# Patient Record
Sex: MALE | Race: BLACK OR AFRICAN AMERICAN | Employment: FULL TIME | URBAN - METROPOLITAN AREA
[De-identification: names, ages, dates, MRNs, and addresses within clinical notes are randomized per-mention and may not be internally consistent; named-entity substitution may affect disease eponyms.]

---

## 2019-06-10 ENCOUNTER — OFFICE VISIT (OUTPATIENT)
Dept: INTERNAL MEDICINE CLINIC | Age: 39
End: 2019-06-10

## 2019-06-10 VITALS
HEART RATE: 68 BPM | DIASTOLIC BLOOD PRESSURE: 70 MMHG | SYSTOLIC BLOOD PRESSURE: 140 MMHG | WEIGHT: 248 LBS | HEIGHT: 72 IN | BODY MASS INDEX: 33.59 KG/M2 | OXYGEN SATURATION: 98 % | TEMPERATURE: 99.1 F | RESPIRATION RATE: 19 BRPM

## 2019-06-10 DIAGNOSIS — S93.422A SPRAIN, ANKLE JOINT, MEDIAL, LEFT, INITIAL ENCOUNTER: ICD-10-CM

## 2019-06-10 DIAGNOSIS — E78.00 HYPERCHOLESTEROLEMIA: ICD-10-CM

## 2019-06-10 DIAGNOSIS — E66.9 OBESITY (BMI 30-39.9): ICD-10-CM

## 2019-06-10 DIAGNOSIS — I10 ESSENTIAL HYPERTENSION: Primary | ICD-10-CM

## 2019-06-10 LAB
CHOLEST SERPL-MCNC: 267 MG/DL
GLUCOSE POC: 120 MG/DL
HBA1C MFR BLD HPLC: 5.5 %
HDLC SERPL-MCNC: 49 MG/DL
LDL CHOLESTEROL POC: 195 MG/DL
NON-HDL GOAL (POC): 218
TCHOL/HDL RATIO (POC): 5.5
TRIGL SERPL-MCNC: 115 MG/DL

## 2019-06-10 RX ORDER — ASPIRIN 81 MG/1
TABLET ORAL DAILY
COMMUNITY
End: 2020-09-08

## 2019-06-10 RX ORDER — GLUCOSAM/CHONDRO/HERB 149/HYAL 750-100 MG
1 TABLET ORAL DAILY
COMMUNITY

## 2019-06-10 RX ORDER — AMLODIPINE BESYLATE 5 MG/1
5 TABLET ORAL DAILY
Qty: 30 TAB | Refills: 2 | Status: SHIPPED | OUTPATIENT
Start: 2019-06-10 | End: 2019-08-16 | Stop reason: SDUPTHER

## 2019-06-10 RX ORDER — LISINOPRIL 10 MG/1
TABLET ORAL DAILY
COMMUNITY
End: 2019-08-16 | Stop reason: SDUPTHER

## 2019-06-10 NOTE — PATIENT INSTRUCTIONS
High blood pressure: Take all medicine as prescribed High Cholesterol - Follow diet for low cholesterol - Follow \"Plate\" diet: 
- half plate should be veggies (leafy greens, broccoli, peppers, cauliflower, cabbage, string beans, asparagus, squash. ..), with a little fruit 
- one fourth plate should be protein (fish, chicken, meat, tofu. ..) - one fourth plate limit for 
 - starch (beans without added sugar are great here) OR 
 - starchy veggies (lima beans, corn) OR 
 - rice / potato (sweet potato without added sugar is great here) - bread or biscuit - Eat Your Veggies First! 
- Drink one full glass of water Before you start your meal. 
- No Seconds! Hyperlipidemia: After Your Visit Your Care Instructions Hyperlipidemia is too much fat in your blood. The body has several kinds of fat, including cholesterol and triglycerides. Your body needs fat for many things, such as making new cells. But too much fat in your blood increases your chances of having a heart attack or stroke. You may be able to lower your cholesterol and triglycerides with a heart-healthy diet, exercise, and if needed, medicine. Your doctor may want you to try lifestyle changes first to see whether they lower the fat in your blood. You may need to take medicine if lifestyle changes do not lower the fat in your blood enough. Follow-up care is a key part of your treatment and safety. Be sure to make and go to all appointments, and call your doctor if you are having problems. Its also a good idea to know your test results and keep a list of the medicines you take. How can you care for yourself at home? Take your medicines · Take your medicines exactly as prescribed. Call your doctor if you think you are having a problem with your medicine. · If you take medicine to lower your cholesterol, go to follow-up visits. You will need to have blood tests.  
· Do not take large doses of niacin, which is a B vitamin, while taking medicine called statins. It may increase the chance of muscle pain and liver problems. · Talk to your doctor about avoiding grapefruit juice if you are taking statins. Grapefruit juice can raise the level of this medicine in your blood. This could increase side effects. Eat more fruits, vegetables, and fiber · Fruits and vegetables have lots of nutrients that help protect against heart disease, and they have littleif anyfat. Try to eat at least five servings a day. Dark green, deep orange, or yellow fruits and vegetables are healthy choices. · Keep carrots, celery, and other veggies handy for snacks. Buy fruit that is in season and store it where you can see it so that you will be tempted to eat it. Cook dishes that have a lot of veggies in them, such as stir-fries and soups. · Foods high in fiber may reduce your cholesterol and provide important vitamins and minerals. High-fiber foods include whole-grain cereals and breads, oatmeal, beans, brown rice, citrus fruits, and apples. · Buy whole-grain breads and cereals instead of white bread and pastries. Limit saturated fat · Read food labels and try to avoid saturated fat and trans fat. They increase your risk of heart disease. · Use olive or canola oil when you cook. Try cholesterol-lowering spreads, such as Benecol or Take Control. · Bake, broil, grill, or steam foods instead of frying them. · Limit the amount of high-fat meats you eat, including hot dogs and sausages. Cut out all visible fat when you prepare meat. · Eat fish, skinless poultry, and soy products such as tofu instead of high-fat meats. Soybeans may be especially good for your heart. Eat at least two servings of fish a week. Certain fish, such as salmon, contain omega-3 fatty acids, which may help reduce your risk of heart attack. · Choose low-fat or fat-free milk and dairy products. Get exercise, limit alcohol, and quit smoking · Get more exercise. Work with your doctor to set up an exercise program. Even if you can do only a small amount, exercise will help you get stronger, have more energy, and manage your weight and your stress. Walking is an easy way to get exercise. Gradually increase the amount you walk every day. Aim for at least 30 minutes on most days of the week. You also may want to swim, bike, or do other activities. · Limit alcohol to no more than 2 drinks a day for men and 1 drink a day for women. · Do not smoke. If you need help quitting, talk to your doctor about stop-smoking programs and medicines. These can increase your chances of quitting for good. When should you call for help? Call 911 anytime you think you may need emergency care. For example, call if: 
· You have symptoms of a heart attack. These may include: ¨ Chest pain or pressure, or a strange feeling in the chest. 
¨ Sweating. ¨ Shortness of breath. ¨ Nausea or vomiting. ¨ Pain, pressure, or a strange feeling in the back, neck, jaw, or upper belly or in one or both shoulders or arms. ¨ Lightheadedness or sudden weakness. ¨ A fast or irregular heartbeat. After you call 911, the  may tell you to chew 1 adult-strength or 2 to 4 low-dose aspirin. Wait for an ambulance. Do not try to drive yourself. · You have signs of a stroke. These may include: 
¨ Sudden numbness, paralysis, or weakness in your face, arm, or leg, especially on only one side of your body. ¨ New problems with walking or balance. ¨ Sudden vision changes. ¨ Drooling or slurred speech. ¨ New problems speaking or understanding simple statements, or feeling confused. ¨ A sudden, severe headache that is different from past headaches. · You passed out (lost consciousness). Call your doctor now or seek immediate medical care if: 
· You have muscle pain or weakness. Watch closely for changes in your health, and be sure to contact your doctor if: 
· You are very tired. · You have an upset stomach, gas, constipation, or belly pain or cramps. Where can you learn more? Go to [employer] Enter C406 in the search box to learn more about \"Hyperlipidemia: After Your Visit. \"  
© 7874-7913 Healthwise, Incorporated. Care instructions adapted under license by [facility] (which disclaims liability or warranty for this information). This care instruction is for use with your licensed healthcare professional. If you have questions about a medical condition or this instruction, always ask your healthcare professional. [place] [address] any warranty or liability for your use of this information. Content Version: [id number]; Last Revised: October 13, 2011 High Cholesterol: Care Instructions Your Care Instructions Cholesterol is a type of fat in your blood. It is needed for many body functions, such as making new cells. Cholesterol is made by your body. It also comes from food you eat. High cholesterol means that you have too much of the fat in your blood. This raises your risk of a heart attack and stroke. LDL and HDL are part of your total cholesterol. LDL is the \"bad\" cholesterol. High LDL can raise your risk for heart disease, heart attack, and stroke. HDL is the \"good\" cholesterol. It helps clear bad cholesterol from the body. High HDL is linked with a lower risk of heart disease, heart attack, and stroke. Your cholesterol levels help your doctor find out your risk for having a heart attack or stroke. You and your doctor can talk about whether you need to lower your risk and what treatment is best for you. A heart-healthy lifestyle along with medicines can help lower your cholesterol and your risk. The way you choose to lower your risk will depend on how high your risk is for heart attack and stroke. It will also depend on how you feel about taking medicines. Follow-up care is a key part of your treatment and safety. Be sure to make and go to all appointments, and call your doctor if you are having problems. It's also a good idea to know your test results and keep a list of the medicines you take. How can you care for yourself at home? · Eat a variety of foods every day. Good choices include fruits, vegetables, whole grains (like oatmeal), dried beans and peas, nuts and seeds, soy products (like tofu), and fat-free or low-fat dairy products. · Replace butter, margarine, and hydrogenated or partially hydrogenated oils with olive and canola oils. (Canola oil margarine without trans fat is fine.) · Replace red meat with fish, poultry, and soy protein (like tofu). · Limit processed and packaged foods like chips, crackers, and cookies. · Bake, broil, or steam foods. Don't carias them. · Be physically active. Get at least 30 minutes of exercise on most days of the week. Walking is a good choice. You also may want to do other activities, such as running, swimming, cycling, or playing tennis or team sports. · Stay at a healthy weight or lose weight by making the changes in eating and physical activity listed above. Losing just a small amount of weight, even 5 to 10 pounds, can reduce your risk for having a heart attack or stroke. · Do not smoke. When should you call for help? Watch closely for changes in your health, and be sure to contact your doctor if: 
  · You need help making lifestyle changes.  
  · You have questions about your medicine. Where can you learn more? Go to http://katia-valerie.info/. Enter J097 in the search box to learn more about \"High Cholesterol: Care Instructions. \" Current as of: July 22, 2018 Content Version: 11.9 © 5020-0239 [employer].  Care instructions adapted under license by [employer] (which disclaims liability or warranty for this information). If you have questions about a medical condition or this instruction, always ask your healthcare professional. [address] any warranty or liability for your use of this information. High Cholesterol: Care Instructions Your Care Instructions Cholesterol is a type of fat in your blood. It is needed for many body functions, such as making new cells. Cholesterol is made by your body. It also comes from food you eat. High cholesterol means that you have too much of the fat in your blood. This raises your risk of a heart attack and stroke. LDL and HDL are part of your total cholesterol. LDL is the \"bad\" cholesterol. High LDL can raise your risk for heart disease, heart attack, and stroke. HDL is the \"good\" cholesterol. It helps clear bad cholesterol from the body. High HDL is linked with a lower risk of heart disease, heart attack, and stroke. Your cholesterol levels help your doctor find out your risk for having a heart attack or stroke. You and your doctor can talk about whether you need to lower your risk and what treatment is best for you. A heart-healthy lifestyle along with medicines can help lower your cholesterol and your risk. The way you choose to lower your risk will depend on how high your risk is for heart attack and stroke. It will also depend on how you feel about taking medicines. Follow-up care is a key part of your treatment and safety. Be sure to make and go to all appointments, and call your doctor if you are having problems. It's also a good idea to know your test results and keep a list of the medicines you take. How can you care for yourself at home? · Eat a variety of foods every day. Good choices include fruits, vegetables, whole grains (like oatmeal), dried beans and peas, nuts and seeds, soy products (like tofu), and fat-free or low-fat dairy products.  
· Replace butter, margarine, and hydrogenated or partially hydrogenated oils with olive and canola oils. (Canola oil margarine without trans fat is fine.) · Replace red meat with fish, poultry, and soy protein (like tofu). · Limit processed and packaged foods like chips, crackers, and cookies. · Bake, broil, or steam foods. Don't carias them. · Be physically active. Get at least 30 minutes of exercise on most days of the week. Walking is a good choice. You also may want to do other activities, such as running, swimming, cycling, or playing tennis or team sports. · Stay at a healthy weight or lose weight by making the changes in eating and physical activity listed above. Losing just a small amount of weight, even 5 to 10 pounds, can reduce your risk for having a heart attack or stroke. · Do not smoke. When should you call for help? Watch closely for changes in your health, and be sure to contact your doctor if: 
  · You need help making lifestyle changes.  
  · You have questions about your medicine. Where can you learn more? Go to http://katia-valerie.info/. Enter F121 in the search box to learn more about \"High Cholesterol: Care Instructions. \" Current as of: July 22, 2018 Content Version: 11.9 © 6279-1240 [email or web address]. Care instructions adapted under license by [employer] (which disclaims liability or warranty for this information). If you have questions about a medical condition or this instruction, always ask your healthcare professional. [place] [address] any warranty or liability for your use of this information. High Cholesterol: Care Instructions Your Care Instructions Cholesterol is a type of fat in your blood. It is needed for many body functions, such as making new cells. Cholesterol is made by your body. It also comes from food you eat. High cholesterol means that you have too much of the fat in your blood. This raises your risk of a heart attack and stroke. LDL and HDL are part of your total cholesterol. LDL is the \"bad\" cholesterol. High LDL can raise your risk for heart disease, heart attack, and stroke. HDL is the \"good\" cholesterol. It helps clear bad cholesterol from the body. High HDL is linked with a lower risk of heart disease, heart attack, and stroke. Your cholesterol levels help your doctor find out your risk for having a heart attack or stroke. You and your doctor can talk about whether you need to lower your risk and what treatment is best for you. A heart-healthy lifestyle along with medicines can help lower your cholesterol and your risk. The way you choose to lower your risk will depend on how high your risk is for heart attack and stroke. It will also depend on how you feel about taking medicines. Follow-up care is a key part of your treatment and safety. Be sure to make and go to all appointments, and call your doctor if you are having problems. It's also a good idea to know your test results and keep a list of the medicines you take. How can you care for yourself at home? · Eat a variety of foods every day. Good choices include fruits, vegetables, whole grains (like oatmeal), dried beans and peas, nuts and seeds, soy products (like tofu), and fat-free or low-fat dairy products. · Replace butter, margarine, and hydrogenated or partially hydrogenated oils with olive and canola oils. (Canola oil margarine without trans fat is fine.) · Replace red meat with fish, poultry, and soy protein (like tofu). · Limit processed and packaged foods like chips, crackers, and cookies. · Bake, broil, or steam foods. Don't carias them. · Be physically active. Get at least 30 minutes of exercise on most days of the week. Walking is a good choice. You also may want to do other activities, such as running, swimming, cycling, or playing tennis or team sports.  
· Stay at a healthy weight or lose weight by making the changes in eating and physical activity listed above. Losing just a small amount of weight, even 5 to 10 pounds, can reduce your risk for having a heart attack or stroke. · Do not smoke. When should you call for help? Watch closely for changes in your health, and be sure to contact your doctor if: 
  · You need help making lifestyle changes.  
  · You have questions about your medicine. Where can you learn more? Go to http://katia-valerie.info/. Enter I642 in the search box to learn more about \"High Cholesterol: Care Instructions. \" Current as of: July 22, 2018 Content Version: 11.9 © 2171-2424 [employer], [email or web address]. Care instructions adapted under license by [employer] (which disclaims liability or warranty for this information). If you have questions about a medical condition or this instruction, always ask your healthcare professional. [address] any warranty or liability for your use of this information.

## 2019-06-10 NOTE — LETTER
1/28/2020 2:11 PM 
 
Mr. Malik [address] [address] Dear Hamida Edwards., 
 
When you were seen in the office on 6/10/2019, you had a complete physical and lab work: 
 
CBC, CMP, LIPID PANEL, GLUCOSE AND A1C. Please call if you have any questions [phone]. Sincerely, Elise Wong MD

## 2019-06-10 NOTE — LETTER
1/28/2020 2:11 PM 
 
Mr. Malik [address] [id number] Dear [facility] 
 
When you were seen in the office on 6/10/2019, you had a wellness exam & visit which included a complete physical and lab work: 
 
CBC, CMP, LIPID PANEL, GLUCOSE AND A1C. Your were given instructions for diet, weight loss goal of BMI less than 29 and advised to perform at least 150 mins/wk of moderate exercise, like brisk walking. Please call if you have any questions [phone]. Sincerely, Barb Landa MD

## 2019-06-10 NOTE — PROGRESS NOTES
1. Have you been to the ER, urgent care clinic since your last visit? Hospitalized since your last visit? yes feb 2019    2. Have you seen or consulted any other health care providers outside of the [address] [place] [address] [facility] since your last visit? Include any pap smears or colon screening.  no        3 most recent PHQ Screens 6/10/2019   PHQ Not Done Patient Decline     Chief Complaint   Patient presents with   [address]

## 2019-06-28 LAB
ALBUMIN SERPL-MCNC: 4.1 G/DL (ref 3.5–5.5)
ALBUMIN/GLOB SERPL: 1.4 {RATIO} (ref 1.2–2.2)
ALP SERPL-CCNC: 46 IU/L (ref 39–117)
ALT SERPL-CCNC: 16 IU/L (ref 0–44)
AST SERPL-CCNC: 20 IU/L (ref 0–40)
BILIRUB SERPL-MCNC: 0.4 MG/DL (ref 0–1.2)
BUN SERPL-MCNC: 14 MG/DL (ref 6–24)
BUN/CREAT SERPL: 14 (ref 9–20)
CALCIUM SERPL-MCNC: 9.2 MG/DL (ref 8.7–10.2)
CHLORIDE SERPL-SCNC: 102 MMOL/L (ref 96–106)
CO2 SERPL-SCNC: 27 MMOL/L (ref 20–29)
CREAT SERPL-MCNC: 1.03 MG/DL (ref 0.76–1.27)
ERYTHROCYTE [DISTWIDTH] IN BLOOD BY AUTOMATED COUNT: 12.4 % (ref 12.3–15.4)
GLOBULIN SER CALC-MCNC: 2.9 G/DL (ref 1.5–4.5)
GLUCOSE SERPL-MCNC: 103 MG/DL (ref 65–99)
HCT VFR BLD AUTO: 46 % (ref 37.5–51)
HGB BLD-MCNC: 14.7 G/DL (ref 13–17.7)
MCH RBC QN AUTO: 27.6 PG (ref 26.6–33)
MCHC RBC AUTO-ENTMCNC: 32 G/DL (ref 31.5–35.7)
MCV RBC AUTO: 86 FL (ref 79–97)
PLATELET # BLD AUTO: 288 X10E3/UL (ref 150–450)
POTASSIUM SERPL-SCNC: 4.8 MMOL/L (ref 3.5–5.2)
PROT SERPL-MCNC: 7 G/DL (ref 6–8.5)
RBC # BLD AUTO: 5.33 X10E6/UL (ref 4.14–5.8)
SODIUM SERPL-SCNC: 140 MMOL/L (ref 134–144)
WBC # BLD AUTO: 5.1 X10E3/UL (ref 3.4–10.8)

## 2019-07-08 ENCOUNTER — OFFICE VISIT (OUTPATIENT)
Dept: INTERNAL MEDICINE CLINIC | Age: 39
End: 2019-07-08

## 2019-07-08 VITALS
WEIGHT: 247.8 LBS | BODY MASS INDEX: 33.56 KG/M2 | HEIGHT: 72 IN | DIASTOLIC BLOOD PRESSURE: 74 MMHG | RESPIRATION RATE: 16 BRPM | OXYGEN SATURATION: 99 % | SYSTOLIC BLOOD PRESSURE: 116 MMHG | TEMPERATURE: 98.6 F | HEART RATE: 72 BPM

## 2019-07-08 DIAGNOSIS — E78.00 HYPERCHOLESTEROLEMIA: ICD-10-CM

## 2019-07-08 DIAGNOSIS — Z79.82 LONG TERM CURRENT USE OF ASPIRIN: ICD-10-CM

## 2019-07-08 DIAGNOSIS — R94.31 ABNORMAL EKG: ICD-10-CM

## 2019-07-08 DIAGNOSIS — E66.9 OBESITY (BMI 30-39.9): ICD-10-CM

## 2019-07-08 DIAGNOSIS — I10 ESSENTIAL HYPERTENSION: Primary | ICD-10-CM

## 2019-07-08 LAB
CHOLEST SERPL-MCNC: 213 MG/DL
HDLC SERPL-MCNC: 52 MG/DL
LDL CHOLESTEROL POC: 127 MG/DL
NON-HDL CHOLESTEROL, 011976: 161
TCHOL/HDL RATIO (POC): 4.1
TRIGL SERPL-MCNC: 170 MG/DL

## 2019-07-08 NOTE — PROGRESS NOTES
1. Have you been to the ER, urgent care clinic since your last visit? Hospitalized since your last visit?no    2. Have you seen or consulted any other health care providers outside of the [address] [place] [address] since your last visit? Include any pap smears or colon screening.  No    3 most recent PHQ Screens 6/10/2019   PHQ Not Done Patient Decline     Chief Complaint   Patient presents with    Hypertension    Cholesterol Problem    Weight Management

## 2019-07-08 NOTE — PATIENT INSTRUCTIONS
Update on Diet:    Prescription for Obesity    · Avoid processed foods. · If you eat out, get the nutrition information from fast food or chain restaurants. · Choose the meals that are less than 400-500 calories. · Exercise:  · Walk non stop for 20 - 45 mins 5 days/wk    · Prepare your meals & snacks in advance if you want to stick to eating healthy for the long term. · Take time to eat your meal Slowly over 30 mins   · Take time & savor each bite  · Put fork down with each bite  · Chew slowly  · Avoid other activities except talking to your meal companions. · Drink one full glass of water Before you start your meal.    · Salad? · Get dressing on the side  · Dip fork into dressing before each forkful of salad, unless the oil in the dressing is extra virgin olive oil (EVOO, you can pour the dressing onto the salad). · Eat Your Veggies First!     Follow \"Plate\" diet:  -half plate should be veggies  - leafy greens  - broccoli, peppers, cauliflower, cabbage, string beans, asparagus, squash. ..  - a little bit of fruit  - one fourth plate should be protein (fish, chicken, meat, tofu, Greek yogurt. ..)  - one fourth plate limit for starch  - beans without added sugar are great here) OR  - sweet potato without added sugar is great here OR    - starchy veggies (lima beans, corn) OR    - rice / potato OR    - bread or biscuit    - No Seconds!   - if you can't avoid seconds, follow the above for second plate    --------------------------------------------------------------------------------    Reminder of Low Cholesterol Diet (Mediterranean Diet is also Very Good)   - use Extr Virgin Olive Oil in your dressings and instead of butter    Eat more fruits, vegetables, and fiber   Fruits and vegetables have lots of nutrients that help protect against heart disease, and they have little--if any--fat. · Try to eat at least five servings a day.    · Dark green, deep orange, or yellow fruits and vegetables are healthy choices.  Keep carrots, celery, and other veggies handy for snacks. · Buy fruit that is in season and store it where you can see it so that you will be tempted to eat it. · Cook dishes that have a lot of veggies in them, such as stir-fries and soups.  Foods high in fiber may reduce your cholesterol and provide important vitamins and minerals. · High-fiber foods include whole-grain cereals and breads, oatmeal, beans, brown rice, citrus fruits, and apples.  Buy whole-grain breads and cereals instead of white bread and pastries. Limit saturated fat   Read food labels and try to avoid saturated fat and trans fat. They increase your risk of heart disease.  Use olive or canola oil when you cook. Try cholesterol-lowering spreads, such as Benecol or Take Control.  Bake, broil, grill, or steam foods instead of frying them.  Limit the amount of high-fat meats you eat, including hot dogs and sausages. Cut out all visible fat when you prepare meat.  Eat fish, skinless poultry, and soy products such as tofu instead of high-fat meats. Soybeans may be especially good for your heart. · Eat at least two servings of fish a week. · Certain fish, such as salmon, contain omega-3 fatty acids, which may help reduce your risk of heart attack.  Choose low-fat or fat-free milk and dairy products. Get exercise, limit alcohol, and quit smoking   Get more exercise. Work with your doctor to set up an exercise program.   · Even if you can do only a small amount, exercise will help you get stronger, have more energy, and manage your weight and your stress. · Walking is an easy way to get exercise. Gradually increase the amount you walk every day. Aim for at least 30 minutes on most days of the week. You also may want to swim, bike, or do other activities.  Limit alcohol to no more than 2 drinks a day for men and 1 drink a day for women.  Do not smoke.  If you need help quitting, talk to your doctor about stop-smoking programs and medicines. These  can increase your chances of quitting for good.

## 2019-07-09 PROBLEM — I10 ESSENTIAL HYPERTENSION: Status: ACTIVE | Noted: 2019-07-09

## 2019-07-09 PROBLEM — E78.00 HYPERCHOLESTEROLEMIA: Status: ACTIVE | Noted: 2019-07-09

## 2019-07-09 PROBLEM — S93.422A: Status: ACTIVE | Noted: 2019-07-09

## 2019-07-09 PROBLEM — E66.9 OBESITY (BMI 30-39.9): Status: ACTIVE | Noted: 2019-07-09

## 2019-08-16 ENCOUNTER — OFFICE VISIT (OUTPATIENT)
Dept: INTERNAL MEDICINE CLINIC | Age: 39
End: 2019-08-16

## 2019-08-16 VITALS
HEIGHT: 72 IN | RESPIRATION RATE: 16 BRPM | SYSTOLIC BLOOD PRESSURE: 116 MMHG | BODY MASS INDEX: 34.4 KG/M2 | DIASTOLIC BLOOD PRESSURE: 76 MMHG | OXYGEN SATURATION: 98 % | HEART RATE: 70 BPM | WEIGHT: 254 LBS | TEMPERATURE: 98.4 F

## 2019-08-16 DIAGNOSIS — E66.9 OBESITY (BMI 30-39.9): ICD-10-CM

## 2019-08-16 DIAGNOSIS — Z79.82 ASPIRIN LONG-TERM USE: ICD-10-CM

## 2019-08-16 DIAGNOSIS — I10 ESSENTIAL HYPERTENSION: Primary | ICD-10-CM

## 2019-08-16 RX ORDER — AMLODIPINE BESYLATE 5 MG/1
5 TABLET ORAL DAILY
Qty: 90 TAB | Refills: 3 | Status: SHIPPED | OUTPATIENT
Start: 2019-08-16 | End: 2020-09-08

## 2019-08-16 RX ORDER — LISINOPRIL 10 MG/1
10 TABLET ORAL DAILY
Qty: 90 TAB | Refills: 3 | Status: SHIPPED | OUTPATIENT
Start: 2019-08-16 | End: 2020-09-08 | Stop reason: SDUPTHER

## 2019-08-16 NOTE — PROGRESS NOTES
1. Have you been to the ER, urgent care clinic since your last visit? Hospitalized since your last visit?no    2. Have you seen or consulted any other health care providers outside of the [address] [place] [address] since your last visit? Include any pap smears or colon screening.  No    3 most recent PHQ Screens 6/10/2019   PHQ Not Done Patient Decline     Chief Complaint   Patient presents with    Hypertension

## 2019-08-16 NOTE — PROGRESS NOTES
CC:    Chief Complaint   Patient presents with    Hypertension     HPI:    1. Essential hypertension   - no chest pain, SOB, BANSAL, dizziness, edema  - taking meds as directed   2. Obesity (BMI 30-39.9)   - new job activities; more on machine, less active  - getting  in 8 days  - gym after work: treadmill x 10 - 15 mins then workout - lift wts. , situps, pullups. - no bread; 2 vegs every night from [place]  - lunch wrap w/ veggies, meat, lo salt chips   3. Aspirin long-term use   - read info provided; agreed not [age], doesn't think needs asa  - discussed benefits & risks  - no hx cva, mi        Current Outpatient Medications:     lisinopril (PRINIVIL, ZESTRIL) 10 mg tablet, Take  by mouth daily. , Disp: , Rfl:     omega 3-DHA-EPA-fish oil (FISH OIL) 1,000 mg (120 mg-180 mg) capsule, Take 1 Cap by mouth daily. , Disp: , Rfl:     aspirin delayed-release 81 mg tablet, Take  by mouth daily. , Disp: , Rfl:     amLODIPine (NORVASC) 5 mg tablet, Take 1 Tab by mouth daily. Indications: high blood pressure, Disp: 30 Tab, Rfl: 2    ASSESSMENT  TREATMENT  PLAN:  1. Essential hypertension  Controlled. Contin meds. - lisinopril (PRINIVIL, ZESTRIL) 10 mg tablet; Take 1 Tab by mouth daily. Indications: high blood pressure  Dispense: 90 Tab; Refill: 3  - amLODIPine (NORVASC) 5 mg tablet; Take 1 Tab by mouth daily. Indications: high blood pressure  Dispense: 90 Tab; Refill: 3    2. Obesity (BMI 30-39. 9)  Uncontrolled. - agrees to increase treadmill to 30 mins, more if can    3. Aspirin long-term use  SDM (Shared Decision Making): Pt understands could have stroke or heart attack.   - we agree to d/c per current literature since he is not [age]       PHYSICAL EXAM:  Vitals:    08/16/19 1543   BP: 116/76   Pulse: 70   Resp: 16   Temp: 98.4 °F (36.9 °C)   TempSrc: Oral   SpO2: 98%   Weight: 254 lb (115.2 kg)   Height: 6' (1.829 m)     Weight Metrics 8/16/2019 7/8/2019 6/10/2019   Weight 254 lb 247 lb 12.8 oz 248 lb   BMI 34.45 kg/m2 33.61 kg/m2 33.63 kg/m2      CONSTITUTIONAL:   WD, obese, appropriately groomed black male in NAD. HISTORY - Additional:  ROS from first OV reviewed & updated as necessary. [place] reviewed & updated as necessary. Active Ambulatory Problems     Diagnosis Date Noted    Essential hypertension 07/09/2019    Hypercholesterolemia 07/09/2019    Sprain, ankle joint, medial, left, initial encounter 07/09/2019    Obesity (BMI 30-39.9) 07/09/2019     Resolved Ambulatory Problems     Diagnosis Date Noted    No Resolved Ambulatory Problems     No Additional Past Medical History     No family history on file.    Past Surgical History:   Procedure Laterality Date    HX ORTHOPAEDIC      Age 13/16: Cut 5 tendons on glass R forearm-> nl now    [address]  2005

## 2019-09-02 NOTE — PROGRESS NOTES
CC:  Hypertension; Cholesterol Problem; and Weight Management    HPI:    1. Essential hypertension    2. Hypercholesterolemia    3. Obesity (BMI 30-39.9)    4. Long term current use of aspirin    - no chest pain, SOB, dizziness, diaphoresis  - taking meds w/o problems  - eating better per diet info given; less cholesterol     Allergies   Allergen Reactions    Benadryl [Diphenhydramine Hcl] Anxiety     Severe panic attack       Current Outpatient Medications:     omega 3-DHA-EPA-fish oil (FISH OIL) 1,000 mg (120 mg-180 mg) capsule, Take 1 Cap by mouth daily. , Disp: , Rfl:     aspirin delayed-release 81 mg tablet, Take  by mouth daily. , Disp: , Rfl:     lisinopril (PRINIVIL, ZESTRIL) 10 mg tablet, Take 1 Tab by mouth daily. Indications: high blood pressure, Disp: 90 Tab, Rfl: 3    amLODIPine (NORVASC) 5 mg tablet, Take 1 Tab by mouth daily. Indications: high blood pressure, Disp: 90 Tab, Rfl: 3      ASSESSMENT  TREATMENT  PLAN:  1. Essential hypertension  2. Hypercholesterolemia  3. Obesity (BMI 30-39.9)  4. Abnormal EKG  5. Long term current use of aspirin  BP much improved. Wt down 1 lb  - AMB POC LIPID PROFILE  - AMB POC EKG ROUTINE W/ 12 LEADS, INTER & REP  - diets & plan for ASA discussion/rationale at next visit reviewed     Patient Instructions   Update on Diet:    Prescription for Obesity    · Avoid processed foods. · If you eat out, get the nutrition information from fast food or chain restaurants. · Choose the meals that are less than 400-500 calories. · Exercise:  · Walk non stop for 20 - 45 mins 5 days/wk    · Prepare your meals & snacks in advance if you want to stick to eating healthy for the long term. · Take time to eat your meal Slowly over 30 mins   · Take time & savor each bite  · Put fork down with each bite  · Chew slowly  · Avoid other activities except talking to your meal companions. · Drink one full glass of water Before you start your meal.    · Salad?   · Get dressing on the side  · Dip fork into dressing before each forkful of salad, unless the oil in the dressing is extra virgin olive oil (EVOO, you can pour the dressing onto the salad). · Eat Your Veggies First!     Follow \"Plate\" diet:  -half plate should be veggies  - leafy greens  - broccoli, peppers, cauliflower, cabbage, string beans, asparagus, squash. ..  - a little bit of fruit  - one fourth plate should be protein (fish, chicken, meat, tofu, Greek yogurt. ..)  - one fourth plate limit for starch  - beans without added sugar are great here) OR  - sweet potato without added sugar is great here OR    - starchy veggies (lima beans, corn) OR    - rice / potato OR    - bread or biscuit    - No Seconds!   - if you can't avoid seconds, follow the above for second plate    --------------------------------------------------------------------------------    Reminder of Low Cholesterol Diet (Mediterranean Diet is also Very Good)   - use Extr Virgin Olive Oil in your dressings and instead of butter    Eat more fruits, vegetables, and fiber   Fruits and vegetables have lots of nutrients that help protect against heart disease, and they have little--if any--fat. · Try to eat at least five servings a day. · Dark green, deep orange, or yellow fruits and vegetables are healthy choices.  Keep carrots, celery, and other veggies handy for snacks. · Buy fruit that is in season and store it where you can see it so that you will be tempted to eat it. · Cook dishes that have a lot of veggies in them, such as stir-fries and soups.  Foods high in fiber may reduce your cholesterol and provide important vitamins and minerals. · High-fiber foods include whole-grain cereals and breads, oatmeal, beans, brown rice, citrus fruits, and apples.  Buy whole-grain breads and cereals instead of white bread and pastries. Limit saturated fat   Read food labels and try to avoid saturated fat and trans fat.  They increase your risk of heart disease.  Use olive or canola oil when you cook. Try cholesterol-lowering spreads, such as Benecol or Take Control.  Bake, broil, grill, or steam foods instead of frying them.  Limit the amount of high-fat meats you eat, including hot dogs and sausages. Cut out all visible fat when you prepare meat.  Eat fish, skinless poultry, and soy products such as tofu instead of high-fat meats. Soybeans may be especially good for your heart. · Eat at least two servings of fish a week. · Certain fish, such as salmon, contain omega-3 fatty acids, which may help reduce your risk of heart attack.  Choose low-fat or fat-free milk and dairy products. Get exercise, limit alcohol, and quit smoking   Get more exercise. Work with your doctor to set up an exercise program.   · Even if you can do only a small amount, exercise will help you get stronger, have more energy, and manage your weight and your stress. · Walking is an easy way to get exercise. Gradually increase the amount you walk every day. Aim for at least 30 minutes on most days of the week. You also may want to swim, bike, or do other activities.  Limit alcohol to no more than 2 drinks a day for men and 1 drink a day for women.  Do not smoke. If you need help quitting, talk to your doctor about stop-smoking programs and medicines. These  can increase your chances of quitting for good. Follow-up and Dispositions    · Return in about 1 month (around 8/5/2019) for diet,wt,bp,smk,etoh,lab,ASA disc. PHYSICAL EXAM:  Vitals:    07/08/19 1520   BP: 116/74   Pulse: 72   Resp: 16   Temp: 98.6 °F (37 °C)   TempSrc: Oral   SpO2: 99%   Weight: 247 lb 12.8 oz (112.4 kg)   Height: 6' (1.829 m)     Weight Metrics 7/8/2019 6/10/2019   Weight 247 lb 12.8 oz 248 lb   BMI 33.61 kg/m2 33.63 kg/m2        HISTORY - Additional:  ROS from first OV reviewed & updated as necessary. [place] reviewed & updated as necessary.     Active Ambulatory Problems     Diagnosis Date Noted    Essential hypertension 07/09/2019    Hypercholesterolemia 07/09/2019    Sprain, ankle joint, medial, left, initial encounter 07/09/2019    Obesity (BMI 30-39.9) 07/09/2019     Resolved Ambulatory Problems     Diagnosis Date Noted    No Resolved Ambulatory Problems     No Additional Past Medical History     Past Surgical History:   Procedure Laterality Date    [de-identified] ORTHOPAEDIC      Age [age]: Cut 5 tendons on glass R forearm-> nl now    HX WISDOM TEETH EXTRACTION  2005     Social History     Tobacco Use    Smoking status: Former Smoker     Types: Cigarettes    Smokeless tobacco: Never Used   Substance Use Topics    Alcohol use: Yes     Alcohol/week: 12.0 standard drinks     Types: 12 Cans of beer per week     Frequency: 2-4 times a month     Drinks per session: 1 or 2     Binge frequency: Weekly     No family history on file.        C&CC:  45 min

## 2020-01-28 PROBLEM — R94.31 ABNORMAL EKG: Status: ACTIVE | Noted: 2020-01-28
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
## 2020-06-26 ENCOUNTER — OFFICE VISIT (OUTPATIENT)
Dept: INTERNAL MEDICINE CLINIC | Age: 40
End: 2020-06-26

## 2020-06-26 VITALS
WEIGHT: 268 LBS | DIASTOLIC BLOOD PRESSURE: 74 MMHG | HEART RATE: 80 BPM | TEMPERATURE: 98 F | HEIGHT: 72 IN | SYSTOLIC BLOOD PRESSURE: 112 MMHG | RESPIRATION RATE: 16 BRPM | OXYGEN SATURATION: 97 % | BODY MASS INDEX: 36.3 KG/M2

## 2020-06-26 DIAGNOSIS — E66.01 SEVERE OBESITY (HCC): ICD-10-CM

## 2020-06-26 DIAGNOSIS — Z23 ENCOUNTER FOR IMMUNIZATION: ICD-10-CM

## 2020-06-26 DIAGNOSIS — Z00.00 HEALTHCARE MAINTENANCE: ICD-10-CM

## 2020-06-26 DIAGNOSIS — Z13.1 SCREENING FOR DIABETES MELLITUS: ICD-10-CM

## 2020-06-26 DIAGNOSIS — R14.3 FLATULENCE: Primary | ICD-10-CM

## 2020-06-26 DIAGNOSIS — E78.5 DYSLIPIDEMIA: ICD-10-CM

## 2020-06-26 LAB
CHOLEST SERPL-MCNC: 248 MG/DL
GLUCOSE POC: 111 MG/DL
HBA1C MFR BLD HPLC: 5.4 %
HDLC SERPL-MCNC: 43 MG/DL
LDL CHOLESTEROL POC: 171 MG/DL
NON-HDL CHOLESTEROL, 011976: 205
TCHOL/HDL RATIO (POC): 5.7
TRIGL SERPL-MCNC: 171 MG/DL

## 2020-06-26 RX ORDER — SIMETHICONE 80 MG
80 TABLET,CHEWABLE ORAL
Qty: 30 TAB | Refills: 0 | Status: SHIPPED | OUTPATIENT
Start: 2020-06-26 | End: 2020-06-26

## 2020-06-26 RX ORDER — SIMETHICONE 80 MG
80 TABLET,CHEWABLE ORAL
Qty: 60 TAB | Refills: 3 | Status: SHIPPED | OUTPATIENT
Start: 2020-06-26 | End: 2020-09-08

## 2020-06-26 NOTE — PATIENT INSTRUCTIONS
Body Mass Index: Care Instructions Your Care Instructions Body mass index (BMI) can help you see if your weight is raising your risk for health problems. It uses a formula to compare how much you weigh with how tall you are. · A BMI lower than 18.5 is considered underweight. · A BMI between 18.5 and 24.9 is considered healthy. · A BMI between 25 and 29.9 is considered overweight. A BMI of 30 or higher is considered obese. If your BMI is in the normal range, it means that you have a lower risk for weight-related health problems. If your BMI is in the overweight or obese range, you may be at increased risk for weight-related health problems, such as high blood pressure, heart disease, stroke, arthritis or joint pain, and diabetes. If your BMI is in the underweight range, you may be at increased risk for health problems such as fatigue, lower protection (immunity) against illness, muscle loss, bone loss, hair loss, and hormone problems. BMI is just one measure of your risk for weight-related health problems. You may be at higher risk for health problems if you are not active, you eat an unhealthy diet, or you drink too much alcohol or use tobacco products. Follow-up care is a key part of your treatment and safety. Be sure to make and go to all appointments, and call your doctor if you are having problems. It's also a good idea to know your test results and keep a list of the medicines you take. How can you care for yourself at home? · Practice healthy eating habits. This includes eating plenty of fruits, vegetables, whole grains, lean protein, and low-fat dairy. · If your doctor recommends it, get more exercise. Walking is a good choice. Bit by bit, increase the amount you walk every day. Try for at least 30 minutes on most days of the week. · Do not smoke. Smoking can increase your risk for health problems.  If you need help quitting, talk to your doctor about stop-smoking programs and medicines. These can increase your chances of quitting for good. · Limit alcohol to 2 drinks a day for men and 1 drink a day for women. Too much alcohol can cause health problems. If you have a BMI higher than 25 · Your doctor may do other tests to check your risk for weight-related health problems. This may include measuring the distance around your waist. A waist measurement of more than 40 inches in men or 35 inches in women can increase the risk of weight-related health problems. · Talk with your doctor about steps you can take to stay healthy or improve your health. You may need to make lifestyle changes to lose weight and stay healthy, such as changing your diet and getting regular exercise. If you have a BMI lower than 18.5 · Your doctor may do other tests to check your risk for health problems. · Talk with your doctor about steps you can take to stay healthy or improve your health. You may need to make lifestyle changes to gain or maintain weight and stay healthy, such as getting more healthy foods in your diet and doing exercises to build muscle. Where can you learn more? Go to http://katia-valerie.info/ Enter S176 in the search box to learn more about \"Body Mass Index: Care Instructions. \" Current as of: December 11, 2019               Content Version: 12.5 © 2965-7508 [employer]. Care instructions adapted under license by [employer] (which disclaims liability or warranty for this information). If you have questions about a medical condition or this instruction, always ask your healthcare professional. [place] [address] any warranty or liability for your use of this information. DTaP (Diphtheria, Tetanus, Pertussis) Vaccine: What You Need to Know Why get vaccinated? DTaP vaccine can prevent diphtheria, tetanus, and pertussis. Diphtheria and pertussis spread from person to person.  Tetanus enters the body through cuts or wounds. · DIPHTHERIA (D) can lead to difficulty breathing, heart failure, paralysis, or death. · TETANUS (T) causes painful stiffening of the muscles. Tetanus can lead to serious health problems, including being unable to open the mouth, having trouble swallowing and breathing, or death. · PERTUSSIS (aP), also known as \"whooping cough,\" can cause uncontrollable, violent coughing which makes it hard to breathe, eat, or drink. Pertussis can be extremely serious in babies and young children, causing pneumonia, convulsions, brain damage, or death. In teens and adults, it can cause weight loss, loss of bladder control, passing out, and rib fractures from severe coughing. DTaP vaccine DTaP is only for children younger than 9years old. Different vaccines against tetanus, diphtheria, and pertussis (Tdap and Td) are available for older children, adolescents, and adults. It is recommended that children receive 5 doses of DTaP, usually at the following ages: · 2 months · 4 months · 6 months · 1518 months · 46 years DTaP may be given as a stand-alone vaccine, or as part of a combination vaccine (a type of vaccine that combines more than one vaccine together into one shot). DTaP may be given at the same time as other vaccines. Talk with your health care provider Tell your vaccine provider if the person getting the vaccine: 
· Has had an allergic reaction after a previous dose of any vaccine that protects against tetanus, diphtheria, or pertussis, or has any severe, life threatening allergies. · Has had a coma, decreased level of consciousness, or prolonged seizures within 7 days after a previous dose of any pertussis vaccine (DTP or DTaP). · Has seizures or another nervous system problem. · Has ever had Guillain-Barré Syndrome (also called GBS). · Has had severe pain or swelling after a previous dose of any vaccine that protects against tetanus or diphtheria. In some cases, your child's health care provider may decide to postpone DTaP vaccination to a future visit. Children with minor illnesses, such as a cold, may be vaccinated. Children who are moderately or severely ill should usually wait until they recover before getting DTaP. Your child's health care provider can give you more information. Risks of a vaccine reaction · Soreness or swelling where the shot was given, fever, fussiness, feeling tired, loss of appetite, and vomiting sometimes happen after DTaP vaccination. · More serious reactions, such as seizures, non-stop crying for 3 hours or more, or high fever (over 105°F) after DTaP vaccination happen much less often. Rarely, the vaccine is followed by swelling of the entire arm or leg, especially in older children when they receive their fourth or fifth dose. · Very rarely, long-term seizures, coma, lowered consciousness, or permanent brain damage may happen after DTaP vaccination. As with any medicine, there is a very remote chance of a vaccine causing a severe allergic reaction, other serious injury, or death. What if there is a serious problem? An allergic reaction could occur after the vaccinated person leaves the clinic. If you see signs of a severe allergic reaction (hives, swelling of the face and throat, difficulty breathing, a fast heartbeat, dizziness, or weakness), call 9-1-1 and get the person to the nearest hospital. 
For other signs that concern you, call your health care provider. Adverse reactions should be reported to the Vaccine Adverse Event Reporting System (VAERS). Your health care provider will usually file this report, or you can do it yourself. Visit the VAERS website at www.vaers. hhs.gov or call 3-893.899.4966. VAERS is only for reporting reactions, and VAERS staff do not give medical advice.  
The Consolidated Rk Vaccine Injury Compensation Program 
The Consolidated [place]k Vaccine Injury Compensation Program (VICP) is a federal program that was created to compensate people who may have been injured by certain vaccines. Visit the VICP website at www.[facility].gov/vaccinecompensation or call 2-314.719.6547 to learn about the program and about filing a claim. There is a time limit to file a claim for compensation. How can I learn more? · Ask your health care provider. · Call your local or state health department. · Contact the Centers for Disease Control and Prevention (CDC): 
? Call 9-410.611.6852 (1-800-CDC-INFO) or 
? Visit CDC's website at www.cdc.gov/vaccines Vaccine Information Statement (Interim) DTaP (Diphtheria, Tetanus, Pertussis) Vaccine 04/01/2020 
42 JON Theodore [id number] [employer] Centers for Disease Control and Prevention Many Vaccine Information Statements are available in [place] and other languages. See www.immunize.org/vis. Muchas hojas de información sobre vacunas están disponibles en español y en otros idiomas. Visite www.immunize.org/vis. Care instructions adapted under license by [employer] (which disclaims liability or warranty for this information). If you have questions about a medical condition or this instruction, always ask your healthcare professional. [place] [address] any warranty or liability for your use of this information. Gas and Bloating: Care Instructions Your Care Instructions Gas and bloating can be uncomfortable and embarrassing problems. All people pass gas, but some people produce more gas than others, sometimes enough to cause distress. It is normal to pass gas from 6 to 20 times per day. Excess gas usually is not caused by a serious health problem. Gas and bloating usually are caused by something you eat or drink, including some food supplements and medicines. Gas and bloating are usually harmless and go away without treatment. However, changing your diet can help end the problem.  Some over-the-counter medicines can help prevent gas and relieve bloating. Follow-up care is a key part of your treatment and safety. Be sure to make and go to all appointments, and call your doctor if you are having problems. It's also a good idea to know your test results and keep a list of the medicines you take. How can you care for yourself at home? · Keep a food diary if you think a food gives you gas. Write down what you eat or drink. Also record when you get gas. If you notice that a food seems to cause your gas each time, avoid it and see if the gas goes away. Examples of foods that cause gas include: ? Fried and fatty foods. ? Beans. ? Vegetables such as artichokes, asparagus, broccoli, brussels sprouts, cabbage, cauliflower, cucumbers, green peppers, onions, peas, radishes, and raw potatoes. ? Fruits such as apricots, bananas, melons, peaches, pears, prunes, and raw apples. ? Wheat and wheat bran. · Soak dry beans in water overnight, then dump the water and cook the soaked beans in new water. This can help prevent gas and bloating. · If you have problems with lactose, avoid dairy products such as milk and cheese. · Try not to swallow air. Do not drink through a straw, gulp your food, or chew gum. · Take an over-the-counter medicine. Read and follow all instructions on the label. ? Food enzymes, such as Beano, can be added to gas-producing foods to prevent gas. ? Antacids, such as Maalox Anti-Gas and Mylanta Gas, can relieve bloating by making you burp. Be careful when you take over-the-counter antacid medicines. Many of these medicines have aspirin in them. Read the label to make sure that you are not taking more than the recommended dose. Too much aspirin can be harmful. ? Activated charcoal tablets, such as CharcoCaps, may decrease odor from gas you pass.  
? If you have problems with lactose, you can take medicines such as Dairy Ease and Lactaid with dairy products to prevent gas and bloating. · Get some exercise regularly. When should you call for help? [id number] anytime you think you may need emergency care. For example, call if: 
· You have gas and signs of a heart attack, such as: 
? Chest pain or pressure. ? Sweating. ? Shortness of breath. ? Nausea or vomiting. ? Pain that spreads from the chest to the neck, jaw, or one or both shoulders or arms. ? Dizziness or lightheadedness. ? A fast or uneven pulse. After calling 911, chew 1 adult-strength aspirin. Wait for an ambulance. Do not try to drive yourself. Call your doctor now or seek immediate medical care if: 
· You have severe belly pain. · You have blood in your stool. Watch closely for changes in your health, and be sure to contact your doctor if: 
· You have blood or pus in your urine. · Your urine is cloudy or smells bad. · You are burping and have trouble swallowing. · You feel bloated and have swelling in your belly. · You do not get better as expected. Where can you learn more? Go to http://katia-valerie.info/ Enter D050 in the search box to learn more about \"Gas and Bloating: Care Instructions. \" Current as of: June 26, 2019               Content Version: 12.5 © 4327-6294 [employer] Care instructions adapted under license by [employer] (which disclaims liability or warranty for this information). If you have questions about a medical condition or this instruction, always ask your healthcare professional. [place] [address] any warranty or liability for your use of this information. Tdap (Tetanus, Diphtheria, Pertussis) Vaccine: What You Need to Know Why get vaccinated? Tdap vaccine can prevent tetanus, diphtheria, and pertussis. Diphtheria and pertussis spread from person to person. Tetanus enters the body through cuts or wounds. · TETANUS (T) causes painful stiffening of the muscles. Tetanus can lead to serious health problems, including being unable to open the mouth, having trouble swallowing and breathing, or death. · DIPHTHERIA (D) can lead to difficulty breathing, heart failure, paralysis, or death. · PERTUSSIS (aP), also known as \"whooping cough,\" can cause uncontrollable, violent coughing which makes it hard to breathe, eat, or drink. Pertussis can be extremely serious in babies and young children, causing pneumonia, convulsions, brain damage, or death. In teens and adults, it can cause weight loss, loss of bladder control, passing out, and rib fractures from severe coughing. Tdap vaccine Tdap is only for children 7 years and older, adolescents, and adults. Adolescents should receive a single dose of Tdap, preferably at age 6 or 15 years. Pregnant women should get a dose of Tdap during every pregnancy, to protect the  from pertussis. Infants are most at risk for severe, life threatening complications from pertussis. Adults who have never received Tdap should get a dose of Tdap. Also, adults should receive a booster dose every 10 years, or earlier in the case of a severe and dirty wound or burn. Booster doses can be either Tdap or Td (a different vaccine that protects against tetanus and diphtheria but not pertussis). Tdap may be given at the same time as other vaccines. Talk with your health care provider Tell your vaccine provider if the person getting the vaccine: 
· Has had an allergic reaction after a previous dose of any vaccine that protects against tetanus, diphtheria, or pertussis, or has any severe, life threatening allergies. · Has had a coma, decreased level of consciousness, or prolonged seizures within 7 days after a previous dose of any pertussis vaccine (DTP, DTaP, or Tdap). · Has seizures or another nervous system problem. · Has ever had Guillain-Barré Syndrome (also called GBS). · Has had severe pain or swelling after a previous dose of any vaccine that protects against tetanus or diphtheria. In some cases, your health care provider may decide to postpone Tdap vaccination to a future visit. People with minor illnesses, such as a cold, may be vaccinated. People who are moderately or severely ill should usually wait until they recover before getting Tdap vaccine. Your health care provider can give you more information. Risks of a vaccine reaction · Pain, redness, or swelling where the shot was given, mild fever, headache, feeling tired, and nausea, vomiting, diarrhea, or stomachache sometimes happen after Tdap vaccine. People sometimes faint after medical procedures, including vaccination. Tell your provider if you feel dizzy or have vision changes or ringing in the ears. As with any medicine, there is a very remote chance of a vaccine causing a severe allergic reaction, other serious injury, or death. What if there is a serious problem? An allergic reaction could occur after the vaccinated person leaves the clinic. If you see signs of a severe allergic reaction (hives, swelling of the face and throat, difficulty breathing, a fast heartbeat, dizziness, or weakness), call 9-1-1 and get the person to the nearest hospital. 
For other signs that concern you, call your health care provider. Adverse reactions should be reported to the Vaccine Adverse Event Reporting System (VAERS). Your health care provider will usually file this report, or you can do it yourself. Visit the VAERS website at www.vaers. hhs.gov or call 1-714.193.1173. VAERS is only for reporting reactions, and VAERS staff do not give medical advice. The National Vaccine Injury Compensation Program 
The National Vaccine Injury Compensation Program (VICP) is a federal program that was created to compensate people who may have been injured by certain vaccines.  Visit the VICP website at www.hrsa.gov/vaccinecompensation or call 3-690.925.9935 to learn about the program and about filing a claim. There is a time limit to file a claim for compensation. How can I learn more? · Ask your health care provider. · Call your local or state health department. · Contact the Centers for Disease Control and Prevention (CDC): 
? Call 0-540.217.5594 (1-800-CDC-INFO) or 
? Visit CDC's website at www.cdc.gov/vaccines Vaccine Information Statement (Interim) Tdap (Tetanus, Diphtheria, Pertussis) Vaccine 04/01/2020 
42 U. Neo So [id number] Department of [facility] and [employer] Centers for Disease Control and Prevention Many Vaccine Information Statements are available in Occitan and other languages. See www.immunize.org/vis. Muchas hojas de información sobre vacunas están disponibles en español y en otros idiomas. Visite www.immunize.org/vis. Care instructions adapted under license by [employer] (which disclaims liability or warranty for this information). If you have questions about a medical condition or this instruction, always ask your healthcare professional. [address] any warranty or liability for your use of this information.

## 2020-06-26 NOTE — LETTER
NOTIFICATION OF RETURN TO WORK / SCHOOL 
 
6/26/2020 9:32 AM 
 
Mr. Thiago [place] [address] [address] [id number] Raimundo Travis To Whom It May Concern: 
 
Marisa Colin. was under the care of [address] [place] [address] from 6/26/20 to 6/26/20 ***. He will be able to return to work/school on 6/26/20. If there are questions or concerns please have the patient contact our office.  
 
Sincerely, 
 
 
Mickel Apley, NP

## 2020-06-26 NOTE — PROGRESS NOTES
1. Have you been to the ER, urgent care clinic since your last visit? Hospitalized since your last visit?no    2. Have you seen or consulted any other health care providers outside of the [address] [place] [place] [address] since your last visit? Include any pap smears or colon screening.  No    3 most recent PHQ Screens 6/10/2019   PHQ Not Done Patient Decline     Chief Complaint   Patient presents with   TRAMAINE SCHMITT [facility]

## 2020-06-26 NOTE — PROGRESS NOTES
Subjective: Yashira Self is a 41 y.o. male and presents with feelings of fluttering in his stomach. Abdominal discomfort   Patient complains of having abdominal discomfort. The patient describes the discomfort as a painless flutter in his abdomen. Patient stated that th discomfort makes him aggravated but does not hurt. Onset of symptoms was approximately 2 months ago and symptoms have stayed relatively the same. Aggravating factors: none. Alleviating factors: none. Associated symptoms: none. The patient denies nausea, vomiting, diarrhea or epigastric pain. Dyslipidemia Review:  Patient presents for evaluation of lipids. A repeat fasting lipid profile was completed at today's visit. Values were outside of the normal limits, patient wanting to try a 3 month trial of dieting and exercising at this time. The patient does not use medications that may worsen dyslipidemias (corticosteroids, progestins, anabolic steroids, amiodarone, cyclosporine, olanzapine). The patient stated that he does exercises some time and takes fish oil for management. Review of Systems  Constitutional: negative for fevers, chills, anorexia and weight loss  Eyes:   negative for visual disturbance and irritation  ENT:   negative for tinnitus,sore throat,nasal congestion,ear pains. hoarseness  Respiratory:  negative for cough, hemoptysis, dyspnea,wheezing  CV:   negative for chest pain, palpitations, lower extremity edema  GI:   negative for nausea, vomiting, diarrhea, abdominal pain,melena, reports abdominal fluttering  Endo:               negative for polyuria,polydipsia,polyphagia,heat intolerance  Genitourinary: negative for frequency, dysuria and hematuria  Integument:  negative for rash and pruritus  Hematologic:  negative for easy bruising and gum/nose bleeding  Musculoskel: negative for myalgias, arthralgias, back pain, muscle weakness, joint pain  Neurological:  negative for headaches, dizziness, vertigo, memory problems and gait   Behavl/Psych: negative for feelings of anxiety, depression, mood changes    History reviewed. No pertinent past medical history. Past Surgical History:   Procedure Laterality Date    HX ORTHOPAEDIC      Age 13/16: Cut 5 tendons on glass R forearm-> nl now    HX WISDOM TEETH EXTRACTION  2005     Social History     Socioeconomic History    Marital status: UNKNOWN     Spouse name: Not on file    Number of children: Not on file    Years of education: Not on file    Highest education level: Not on file   Tobacco Use    Smoking status: Former Smoker     Types: Cigarettes    Smokeless tobacco: Never Used   Substance and Sexual Activity    Alcohol use: Yes     Alcohol/week: 12.0 standard drinks     Types: 12 Cans of beer per week     Frequency: 2-4 times a month     Drinks per session: 1 or 2     Binge frequency: Weekly    Drug use: Never    Sexual activity: Yes     Partners: Female     No family history on file. Current Outpatient Medications   Medication Sig Dispense Refill    simethicone (MYLICON) 80 mg chewable tablet Take 1 Tab by mouth every six (6) hours as needed for Flatulence. 60 Tab 3    lisinopril (PRINIVIL, ZESTRIL) 10 mg tablet Take 1 Tab by mouth daily. Indications: high blood pressure 90 Tab 3    amLODIPine (NORVASC) 5 mg tablet Take 1 Tab by mouth daily. Indications: high blood pressure 90 Tab 3    omega 3-DHA-EPA-fish oil (FISH OIL) 1,000 mg (120 mg-180 mg) capsule Take 1 Cap by mouth daily.  aspirin delayed-release 81 mg tablet Take  by mouth daily.        Allergies   Allergen Reactions    Benadryl [Diphenhydramine Hcl] Anxiety     Severe panic attack       Objective:  Visit Vitals  /74   Pulse 80   Temp 98 °F (36.7 °C) (Oral)   Resp 16   Ht 6' (1.829 m)   Wt 268 lb (121.6 kg)   SpO2 97%   BMI 36.35 kg/m²     Physical Exam:   General appearance - alert, well appearing, and in no distress  Mental status - alert, oriented to person, place, and time  EYE-MARK, EOMI, corneas normal, no foreign bodies  ENT-ENT exam normal, no neck nodes or sinus tenderness  Nose - normal and patent, no erythema, discharge or polyps  Mouth - mucous membranes moist, pharynx normal without lesions  Neck - supple, no significant adenopathy   Chest - clear to auscultation, no wheezes, rales or rhonchi, symmetric air entry   Heart - normal rate, regular rhythm, normal S1, S2, no murmurs, rubs, clicks or gallops   Abdomen - soft, nontender, nondistended, no masses or organomegaly  Lymph- no adenopathy palpable  Ext-peripheral pulses normal, no pedal edema, no clubbing or cyanosis  Skin-Warm and dry. no hyperpigmentation, vitiligo, or suspicious lesions  Neuro -alert, oriented, normal speech, no focal findings or movement disorder noted  Neck-normal C-spine, no tenderness, full ROM without pain  Feet-no nail deformities or callus formation with good pulses noted      Results for orders placed or performed in visit on 06/26/20   AMB POC LIPID PROFILE   Result Value Ref Range    Cholesterol (POC) 248     Triglycerides (POC) 171     HDL Cholesterol (POC) 43     Non-HDL Cholesterol 205     LDL Cholesterol (POC) 171 MG/DL    TChol/HDL Ratio (POC) 5.7    AMB POC HEMOGLOBIN A1C   Result Value Ref Range    Hemoglobin A1c (POC) 5.4 %   AMB POC GLUCOSE BLOOD, BY GLUCOSE MONITORING DEVICE   Result Value Ref Range    Glucose  mg/dL       Assessment/Plan:    ICD-10-CM ICD-9-CM    1. Flatulence R14.3 567.6 simethicone (MYLICON) 80 mg chewable tablet      DISCONTINUED: simethicone (MYLICON) 80 mg chewable tablet   2. Severe obesity ([address] E66.01 278.01    3. Dyslipidemia E78.5 272.4 AMB POC LIPID PROFILE   4. Healthcare maintenance Z00.00 V70.0 AMB POC LIPID PROFILE      TETANUS, DIPHTHERIA TOXOIDS AND ACELLULAR PERTUSSIS VACCINE (TDAP), IN INDIVIDS. >=7, IM   5. Encounter for immunization Z23 V03.89 TETANUS, DIPHTHERIA TOXOIDS AND ACELLULAR PERTUSSIS VACCINE (TDAP), IN INDIVIDS. >=7, IM   6.  Screening for diabetes mellitus Z13.1 V77.1 AMB POC HEMOGLOBIN A1C      AMB POC GLUCOSE BLOOD, BY GLUCOSE MONITORING DEVICE     Orders Placed This Encounter    TETANUS, DIPHTHERIA TOXOIDS AND ACELLULAR PERTUSSIS VACCINE (TDAP), IN INDIVIDS. >=7, IM    AMB POC LIPID PROFILE    AMB POC HEMOGLOBIN A1C    AMB POC GLUCOSE BLOOD, BY GLUCOSE MONITORING DEVICE    DISCONTD: simethicone (MYLICON) 80 mg chewable tablet     Sig: Take 1 Tab by mouth every six (6) hours as needed for Flatulence. Dispense:  30 Tab     Refill:  0    simethicone (MYLICON) 80 mg chewable tablet     Sig: Take 1 Tab by mouth every six (6) hours as needed for Flatulence. Dispense:  60 Tab     Refill:  3     Patient Instructions        Body Mass Index: Care Instructions  Your Care Instructions     Body mass index (BMI) can help you see if your weight is raising your risk for health problems. It uses a formula to compare how much you weigh with how tall you are. · A BMI lower than 18.5 is considered underweight. · A BMI between 18.5 and 24.9 is considered healthy. · A BMI between 25 and 29.9 is considered overweight. A BMI of 30 or higher is considered obese. If your BMI is in the normal range, it means that you have a lower risk for weight-related health problems. If your BMI is in the overweight or obese range, you may be at increased risk for weight-related health problems, such as high blood pressure, heart disease, stroke, arthritis or joint pain, and diabetes. If your BMI is in the underweight range, you may be at increased risk for health problems such as fatigue, lower protection (immunity) against illness, muscle loss, bone loss, hair loss, and hormone problems. BMI is just one measure of your risk for weight-related health problems. You may be at higher risk for health problems if you are not active, you eat an unhealthy diet, or you drink too much alcohol or use tobacco products. Follow-up care is a key part of your treatment and safety. Be sure to make and go to all appointments, and call your doctor if you are having problems. It's also a good idea to know your test results and keep a list of the medicines you take. How can you care for yourself at home? · Practice healthy eating habits. This includes eating plenty of fruits, vegetables, whole grains, lean protein, and low-fat dairy. · If your doctor recommends it, get more exercise. Walking is a good choice. Bit by bit, increase the amount you walk every day. Try for at least 30 minutes on most days of the week. · Do not smoke. Smoking can increase your risk for health problems. If you need help quitting, talk to your doctor about stop-smoking programs and medicines. These can increase your chances of quitting for good. · Limit alcohol to 2 drinks a day for men and 1 drink a day for women. Too much alcohol can cause health problems. If you have a BMI higher than 25  · Your doctor may do other tests to check your risk for weight-related health problems. This may include measuring the distance around your waist. A waist measurement of more than 40 inches in men or 35 inches in women can increase the risk of weight-related health problems. · Talk with your doctor about steps you can take to stay healthy or improve your health. You may need to make lifestyle changes to lose weight and stay healthy, such as changing your diet and getting regular exercise. If you have a BMI lower than 18.5  · Your doctor may do other tests to check your risk for health problems. · Talk with your doctor about steps you can take to stay healthy or improve your health. You may need to make lifestyle changes to gain or maintain weight and stay healthy, such as getting more healthy foods in your diet and doing exercises to build muscle. Where can you learn more? Go to http://katia-valerie.info/  Enter S176 in the search box to learn more about \"Body Mass Index: Care Instructions. \"  Current as of: December 11, 2019               Content Version: 12.5  © 4975-4522 [employer]. Care instructions adapted under license by [employer] (which disclaims liability or warranty for this information). If you have questions about a medical condition or this instruction, always ask your healthcare professional. [address] any warranty or liability for your use of this information. DTaP (Diphtheria, Tetanus, Pertussis) Vaccine: What You Need to Know  Why get vaccinated? DTaP vaccine can prevent diphtheria, tetanus, and pertussis. Diphtheria and pertussis spread from person to person. Tetanus enters the body through cuts or wounds. · DIPHTHERIA (D) can lead to difficulty breathing, heart failure, paralysis, or death. · TETANUS (T) causes painful stiffening of the muscles. Tetanus can lead to serious health problems, including being unable to open the mouth, having trouble swallowing and breathing, or death. · PERTUSSIS (aP), also known as \"whooping cough,\" can cause uncontrollable, violent coughing which makes it hard to breathe, eat, or drink. Pertussis can be extremely serious in babies and young children, causing pneumonia, convulsions, brain damage, or death. In teens and adults, it can cause weight loss, loss of bladder control, passing out, and rib fractures from severe coughing. DTaP vaccine  DTaP is only for children younger than 9years old. Different vaccines against tetanus, diphtheria, and pertussis (Tdap and Td) are available for older children, adolescents, and adults. It is recommended that children receive 5 doses of DTaP, usually at the following ages:  · 2 months  · 4 months  · 6 months  · 1518 months  · 46 years  DTaP may be given as a stand-alone vaccine, or as part of a combination vaccine (a type of vaccine that combines more than one vaccine together into one shot). DTaP may be given at the same time as other vaccines.   Talk with your health care provider  Tell your vaccine provider if the person getting the vaccine:  · Has had an allergic reaction after a previous dose of any vaccine that protects against tetanus, diphtheria, or pertussis, or has any severe, life threatening allergies. · Has had a coma, decreased level of consciousness, or prolonged seizures within 7 days after a previous dose of any pertussis vaccine (DTP or DTaP). · Has seizures or another nervous system problem. · Has ever had Guillain-Barré Syndrome (also called GBS). · Has had severe pain or swelling after a previous dose of any vaccine that protects against tetanus or diphtheria. In some cases, your child's health care provider may decide to postpone DTaP vaccination to a future visit. Children with minor illnesses, such as a cold, may be vaccinated. Children who are moderately or severely ill should usually wait until they recover before getting DTaP. Your child's health care provider can give you more information. Risks of a vaccine reaction  · Soreness or swelling where the shot was given, fever, fussiness, feeling tired, loss of appetite, and vomiting sometimes happen after DTaP vaccination. · More serious reactions, such as seizures, non-stop crying for 3 hours or more, or high fever (over 105°F) after DTaP vaccination happen much less often. Rarely, the vaccine is followed by swelling of the entire arm or leg, especially in older children when they receive their fourth or fifth dose. · Very rarely, long-term seizures, coma, lowered consciousness, or permanent brain damage may happen after DTaP vaccination. As with any medicine, there is a very remote chance of a vaccine causing a severe allergic reaction, other serious injury, or death. What if there is a serious problem? An allergic reaction could occur after the vaccinated person leaves the clinic.  If you see signs of a severe allergic reaction (hives, swelling of the face and throat, difficulty breathing, a fast heartbeat, dizziness, or weakness), call 9-1-1 and get the person to the nearest hospital.  For other signs that concern you, call your health care provider. Adverse reactions should be reported to the Vaccine Adverse Event Reporting System (VAERS). Your health care provider will usually file this report, or you can do it yourself. Visit the VAERS website at www.vaers. [facility].gov or call 9-109.930.2080. VAERS is only for reporting reactions, and VAERS staff do not give medical advice. The National Vaccine Injury Compensation Program  The National Vaccine Injury Compensation Program (VICP) is a federal program that was created to compensate people who may have been injured by certain vaccines. Visit the VICP website at www.[facility].gov/vaccinecompensation or call 4-571.532.5030 to learn about the program and about filing a claim. There is a time limit to file a claim for compensation. How can I learn more? · Ask your health care provider. · Call your local or state health department. · Contact the Centers for Disease Control and Prevention (CDC):  ? Call 0-941.297.4636 (1-800-CDC-INFO) or  ? Visit CDC's website at www.cdc.gov/vaccines  Vaccine Information Statement (Interim)  DTaP (Diphtheria, Tetanus, Pertussis) Vaccine  04/01/2020  [address] J[address] [place] [id number]  Department of Health and Human Services  Centers for Disease Control and Prevention  Many Vaccine Information Statements are available in [place] and other languages. See www.immunize.org/vis. Muchas hojas de información sobre vacunas están disponibles en español y en otros idiomas. Visite www.immunize.org/vis. Care instructions adapted under license by [employer] (which disclaims liability or warranty for this information). If you have questions about a medical condition or this instruction, always ask your healthcare professional. [place] [address] any warranty or liability for your use of this information.          Gas and Bloating: Care Instructions  Your Care Instructions     Gas and bloating can be uncomfortable and embarrassing problems. All people pass gas, but some people produce more gas than others, sometimes enough to cause distress. It is normal to pass gas from 6 to 20 times per day. Excess gas usually is not caused by a serious health problem. Gas and bloating usually are caused by something you eat or drink, including some food supplements and medicines. Gas and bloating are usually harmless and go away without treatment. However, changing your diet can help end the problem. Some over-the-counter medicines can help prevent gas and relieve bloating. Follow-up care is a key part of your treatment and safety. Be sure to make and go to all appointments, and call your doctor if you are having problems. It's also a good idea to know your test results and keep a list of the medicines you take. How can you care for yourself at home? · Keep a food diary if you think a food gives you gas. Write down what you eat or drink. Also record when you get gas. If you notice that a food seems to cause your gas each time, avoid it and see if the gas goes away. Examples of foods that cause gas include:  ? Fried and fatty foods. ? Beans. ? Vegetables such as artichokes, asparagus, broccoli, brussels sprouts, cabbage, cauliflower, cucumbers, green peppers, onions, peas, radishes, and raw potatoes. ? Fruits such as apricots, bananas, melons, peaches, pears, prunes, and raw apples. ? Wheat and wheat bran. · Soak dry beans in water overnight, then dump the water and cook the soaked beans in new water. This can help prevent gas and bloating. · If you have problems with lactose, avoid dairy products such as milk and cheese. · Try not to swallow air. Do not drink through a straw, gulp your food, or chew gum. · Take an over-the-counter medicine. Read and follow all instructions on the label. ?  Food enzymes, such as Beano, can be added to gas-producing foods to prevent gas. ? Antacids, such as Maalox Anti-Gas and Mylanta Gas, can relieve bloating by making you burp. Be careful when you take over-the-counter antacid medicines. Many of these medicines have aspirin in them. Read the label to make sure that you are not taking more than the recommended dose. Too much aspirin can be harmful. ? Activated charcoal tablets, such as CharcoCaps, may decrease odor from gas you pass. ? If you have problems with lactose, you can take medicines such as Dairy Ease and Lactaid with dairy products to prevent gas and bloating. · Get some exercise regularly. When should you call for help? [id number] anytime you think you may need emergency care. For example, call if:  · You have gas and signs of a heart attack, such as:  ? Chest pain or pressure. ? Sweating. ? Shortness of breath. ? Nausea or vomiting. ? Pain that spreads from the chest to the neck, jaw, or one or both shoulders or arms. ? Dizziness or lightheadedness. ? A fast or uneven pulse. After calling 911, chew 1 adult-strength aspirin. Wait for an ambulance. Do not try to drive yourself. Call your doctor now or seek immediate medical care if:  · You have severe belly pain. · You have blood in your stool. Watch closely for changes in your health, and be sure to contact your doctor if:  · You have blood or pus in your urine. · Your urine is cloudy or smells bad. · You are burping and have trouble swallowing. · You feel bloated and have swelling in your belly. · You do not get better as expected. Where can you learn more? Go to http://katia-valerie.info/  Enter [id number] in the search box to learn more about \"Gas and Bloating: Care Instructions. \"  Current as of: June 26, 2019               Content Version: 12.5  © 3788-6372 [employer]. Care instructions adapted under license by [employer] (which disclaims liability or warranty for this information).  If you have questions about a medical condition or this instruction, always ask your healthcare professional. [address] any warranty or liability for your use of this information. Tdap (Tetanus, Diphtheria, Pertussis) Vaccine: What You Need to Know  Why get vaccinated? Tdap vaccine can prevent tetanus, diphtheria, and pertussis. Diphtheria and pertussis spread from person to person. Tetanus enters the body through cuts or wounds. · TETANUS (T) causes painful stiffening of the muscles. Tetanus can lead to serious health problems, including being unable to open the mouth, having trouble swallowing and breathing, or death. · DIPHTHERIA (D) can lead to difficulty breathing, heart failure, paralysis, or death. · PERTUSSIS (aP), also known as \"whooping cough,\" can cause uncontrollable, violent coughing which makes it hard to breathe, eat, or drink. Pertussis can be extremely serious in babies and young children, causing pneumonia, convulsions, brain damage, or death. In teens and adults, it can cause weight loss, loss of bladder control, passing out, and rib fractures from severe coughing. Tdap vaccine  Tdap is only for children 7 years and older, adolescents, and adults. Adolescents should receive a single dose of Tdap, preferably at age 6 or 15 years. Pregnant women should get a dose of Tdap during every pregnancy, to protect the  from pertussis. Infants are most at risk for severe, life threatening complications from pertussis. Adults who have never received Tdap should get a dose of Tdap. Also, adults should receive a booster dose every 10 years, or earlier in the case of a severe and dirty wound or burn. Booster doses can be either Tdap or Td (a different vaccine that protects against tetanus and diphtheria but not pertussis). Tdap may be given at the same time as other vaccines.   Talk with your health care provider  Tell your vaccine provider if the person getting the vaccine:  · Has had an allergic reaction after a previous dose of any vaccine that protects against tetanus, diphtheria, or pertussis, or has any severe, life threatening allergies. · Has had a coma, decreased level of consciousness, or prolonged seizures within 7 days after a previous dose of any pertussis vaccine (DTP, DTaP, or Tdap). · Has seizures or another nervous system problem. · Has ever had Guillain-Barré Syndrome (also called GBS). · Has had severe pain or swelling after a previous dose of any vaccine that protects against tetanus or diphtheria. In some cases, your health care provider may decide to postpone Tdap vaccination to a future visit. People with minor illnesses, such as a cold, may be vaccinated. People who are moderately or severely ill should usually wait until they recover before getting Tdap vaccine. Your health care provider can give you more information. Risks of a vaccine reaction  · Pain, redness, or swelling where the shot was given, mild fever, headache, feeling tired, and nausea, vomiting, diarrhea, or stomachache sometimes happen after Tdap vaccine. People sometimes faint after medical procedures, including vaccination. Tell your provider if you feel dizzy or have vision changes or ringing in the ears. As with any medicine, there is a very remote chance of a vaccine causing a severe allergic reaction, other serious injury, or death. What if there is a serious problem? An allergic reaction could occur after the vaccinated person leaves the clinic. If you see signs of a severe allergic reaction (hives, swelling of the face and throat, difficulty breathing, a fast heartbeat, dizziness, or weakness), call 9-1-1 and get the person to the nearest hospital.  For other signs that concern you, call your health care provider. Adverse reactions should be reported to the Vaccine Adverse Event Reporting System (VAERS).  Your health care provider will usually file this report, or you can do it yourself. Visit the VAERS website at www.vaers. hhs.gov or call 5-645.486.3630. VAERS is only for reporting reactions, and VAERS staff do not give medical advice. The National Vaccine Injury Compensation Program  The National Vaccine Injury Compensation Program (VICP) is a federal program that was created to compensate people who may have been injured by certain vaccines. Visit the VICP website at www.hrsa.gov/vaccinecompensation or call 6-246.321.8558 to learn about the program and about filing a claim. There is a time limit to file a claim for compensation. How can I learn more? · Ask your health care provider. · Call your local or state health department. · Contact the Centers for Disease Control and Prevention (CDC):  ? Call 0-177.777.7909 (8-024-IZI-INFO) or  ? Visit CDC's website at www.cdc.gov/vaccines  Vaccine Information Statement (Interim)  Tdap (Tetanus, Diphtheria, Pertussis) Vaccine  04/01/2020  42 JON Gregory [id number]  Department of Health and Human Services  Centers for Disease Control and Prevention  Many Vaccine Information Statements are available in Vietnamese and other languages. See www.immunize.org/vis. Muchas hojas de información sobre vacunas están disponibles en español y en otros idiomas. Visite www.immunize.org/vis. Care instructions adapted under license by [employer] (which disclaims liability or warranty for this information). If you have questions about a medical condition or this instruction, always ask your healthcare professional. [place] [address] any warranty or liability for your use of this information. Follow-up and Dispositions    · Return in about 3 months (around 9/26/2020), or if symptoms worsen or fail to improve, for [facility], dyslipidemia and labs. I have reviewed the patient's medical history in detail and updated the computerized patient record.       We had a prolonged discussion about these complex clinical issues and went over the various important aspects to consider. Patient expressed understanding with the diagnosis and plan and was given the opportunity to ask questions and receive answers to all questions. Advised patient to call back or return to office if symptoms do not improve, change in nature, or persist. Patient to schedule an appointment in 2 weeks to follow up from today's visit. Patient was given an after-visit summary or informed of [employer] which includes patient instructions, diagnoses, current medications, & vitals.

## 2020-09-08 ENCOUNTER — OFFICE VISIT (OUTPATIENT)
Dept: INTERNAL MEDICINE CLINIC | Age: 40
End: 2020-09-08
Payer: COMMERCIAL

## 2020-09-08 VITALS
HEIGHT: 72 IN | TEMPERATURE: 97.5 F | OXYGEN SATURATION: 96 % | HEART RATE: 86 BPM | DIASTOLIC BLOOD PRESSURE: 84 MMHG | WEIGHT: 269 LBS | SYSTOLIC BLOOD PRESSURE: 134 MMHG | BODY MASS INDEX: 36.44 KG/M2 | RESPIRATION RATE: 16 BRPM

## 2020-09-08 DIAGNOSIS — E78.5 DYSLIPIDEMIA: Primary | ICD-10-CM

## 2020-09-08 DIAGNOSIS — E78.5 DYSLIPIDEMIA: ICD-10-CM

## 2020-09-08 DIAGNOSIS — E66.01 SEVERE OBESITY (HCC): ICD-10-CM

## 2020-09-08 DIAGNOSIS — M54.50 ACUTE BILATERAL LOW BACK PAIN WITHOUT SCIATICA: ICD-10-CM

## 2020-09-08 DIAGNOSIS — Z13.29 SCREENING FOR THYROID DISORDER: ICD-10-CM

## 2020-09-08 DIAGNOSIS — Z13.1 SCREENING FOR DIABETES MELLITUS: ICD-10-CM

## 2020-09-08 DIAGNOSIS — I10 ESSENTIAL HYPERTENSION: Primary | ICD-10-CM

## 2020-09-08 LAB
A-G RATIO,AGRAT: 1.5 RATIO
ALBUMIN SERPL-MCNC: 4.5 G/DL (ref 3.9–5.4)
ALP SERPL-CCNC: 67 U/L (ref 38–126)
ALT SERPL-CCNC: 20 U/L (ref 0–50)
ANION GAP SERPL CALC-SCNC: 10 MMOL/L
AST SERPL W P-5'-P-CCNC: 27 U/L (ref 14–36)
BILIRUB SERPL-MCNC: 0.5 MG/DL (ref 0.2–1.3)
BUN SERPL-MCNC: 17 MG/DL (ref 9–20)
BUN/CREATININE RATIO,BUCR: 14 RATIO
CALCIUM SERPL-MCNC: 9.4 MG/DL (ref 8.4–10.2)
CHLORIDE SERPL-SCNC: 104 MMOL/L (ref 98–107)
CHOL/HDL RATIO,CHHD: 5 RATIO (ref 0–4)
CHOLEST SERPL-MCNC: 230 MG/DL (ref 0–200)
CO2 SERPL-SCNC: 28 MMOL/L (ref 22–32)
CREAT SERPL-MCNC: 1.2 MG/DL (ref 0.8–1.5)
ERYTHROCYTE [DISTWIDTH] IN BLOOD BY AUTOMATED COUNT: 12.8 %
GLOBULIN,GLOB: 3.1
GLUCOSE SERPL-MCNC: 105 MG/DL (ref 75–110)
HCT VFR BLD AUTO: 46.9 % (ref 37–51)
HDLC SERPL-MCNC: 51 MG/DL (ref 35–130)
HGB BLD-MCNC: 15.2 G/DL (ref 12–18)
LDL/HDL RATIO,LDHD: 3 RATIO
LDLC SERPL CALC-MCNC: 145 MG/DL (ref 0–130)
LYMPHOCYTES ABSOLUTE: 2.4 K/UL (ref 0.6–4.1)
LYMPHOCYTES NFR BLD: 35.8 % (ref 10–58.5)
MCH RBC QN AUTO: 28.5 PG (ref 26–32)
MCHC RBC AUTO-ENTMCNC: 32.4 G/DL (ref 30–36)
MCV RBC AUTO: 87.8 FL (ref 80–97)
MONOCYTES ABS-DIF,2141: 0.5 K/UL (ref 0–1.8)
MONOCYTES NFR BLD: 7.8 % (ref 0.1–24)
NEUTROPHILS # BLD: 56.4 % (ref 37–92)
NEUTROPHILS ABS,2156: 3.8 K/UL (ref 2–7.8)
PLATELET # BLD AUTO: 293 K/UL (ref 140–440)
POTASSIUM SERPL-SCNC: 4.5 MMOL/L (ref 3.6–5)
PROT SERPL-MCNC: 7.6 G/DL (ref 6.3–8.2)
RBC # BLD AUTO: 5.34 M/UL (ref 4.2–6.3)
SODIUM SERPL-SCNC: 142 MMOL/L (ref 137–145)
TRIGL SERPL-MCNC: 170 MG/DL (ref 0–200)
VLDLC SERPL CALC-MCNC: 34 MG/DL
WBC # BLD AUTO: 6.7 K/UL (ref 4.1–10.9)

## 2020-09-08 PROCEDURE — 84443 ASSAY THYROID STIM HORMONE: CPT | Performed by: INTERNAL MEDICINE

## 2020-09-08 PROCEDURE — 80061 LIPID PANEL: CPT | Performed by: INTERNAL MEDICINE

## 2020-09-08 PROCEDURE — 80053 COMPREHEN METABOLIC PANEL: CPT | Performed by: INTERNAL MEDICINE

## 2020-09-08 PROCEDURE — 85025 COMPLETE CBC W/AUTO DIFF WBC: CPT | Performed by: INTERNAL MEDICINE

## 2020-09-08 PROCEDURE — 99204 OFFICE O/P NEW MOD 45 MIN: CPT | Performed by: INTERNAL MEDICINE

## 2020-09-08 RX ORDER — LISINOPRIL 10 MG/1
10 TABLET ORAL DAILY
Qty: 90 TAB | Refills: 2 | Status: SHIPPED | OUTPATIENT
Start: 2020-09-08 | End: 2020-12-08

## 2020-09-08 RX ORDER — MELOXICAM 7.5 MG/1
7.5 TABLET ORAL DAILY
Qty: 15 TAB | Refills: 0 | Status: SHIPPED | OUTPATIENT
Start: 2020-09-08 | End: 2020-09-23

## 2020-09-08 RX ORDER — ROSUVASTATIN CALCIUM 5 MG/1
5 TABLET, COATED ORAL
Qty: 90 TAB | Refills: 0 | Status: SHIPPED | OUTPATIENT
Start: 2020-09-08 | End: 2020-12-18

## 2020-09-08 NOTE — PROGRESS NOTES
Has failed lifestyle modification. LDL remains high. Will start low-dose statin. Crestor 5 mg p.o. nightly sent to his preferred pharmacy.

## 2020-09-08 NOTE — PATIENT INSTRUCTIONS
Back Stretches: Exercises Introduction Here are some examples of exercises for stretching your back. Start each exercise slowly. Ease off the exercise if you start to have pain. Your doctor or physical therapist will tell you when you can start these exercises and which ones will work best for you. How to do the exercises Overhead stretch 1. Stand comfortably with your feet shoulder-width apart. 2. Looking straight ahead, raise both arms over your head and reach toward the ceiling. Do not allow your head to tilt back. 3. Hold for 15 to 30 seconds, then lower your arms to your sides. 4. Repeat 2 to 4 times. Side stretch 1. Stand comfortably with your feet shoulder-width apart. 2. Raise one arm over your head, and then lean to the other side. 3. Slide your hand down your leg as you let the weight of your arm gently stretch your side muscles. Hold for 15 to 30 seconds. 4. Repeat 2 to 4 times on each side. Press-up 1. Lie on your stomach, supporting your body with your forearms. 2. Press your elbows down into the floor to raise your upper back. As you do this, relax your stomach muscles and allow your back to arch without using your back muscles. As your press up, do not let your hips or pelvis come off the floor. 3. Hold for 15 to 30 seconds, then relax. 4. Repeat 2 to 4 times. Relax and rest  
1. Lie on your back with a rolled towel under your neck and a pillow under your knees. Extend your arms comfortably to your sides. 2. Relax and breathe normally. 3. Remain in this position for about 10 minutes. 4. If you can, do this 2 or 3 times each day. Follow-up care is a key part of your treatment and safety. Be sure to make and go to all appointments, and call your doctor if you are having problems. It's also a good idea to know your test results and keep a list of the medicines you take. Where can you learn more? Go to http://katia-valerie.info/ Enter B339 in the search box to learn more about \"Back Stretches: Exercises. \" Current as of: March 2, 2020               Content Version: 12.6 © 2006-2020 [employer]. Care instructions adapted under license by [employer] (which disclaims liability or warranty for this information). If you have questions about a medical condition or this instruction, always ask your healthcare professional. [place] [address] any warranty or liability for your use of this information.

## 2020-09-08 NOTE — PROGRESS NOTES
Otf Cruz. is a 41 y.o. male and presents with Establish Care and Back Pain      Subjective:  Patient comes in today to establish care. His father is also a patient of mine. He works as a . His wife is in attendance with him. He has a history of hypertension. Currently well controlled on lisinopril 10 mg. He denies any history of angioedema, swelling of lips or shortness of breath. Denies headaches or blurred vision. Dyslipidemia, last LDL was 171. It was diet controlled. He is not on any statins. He reports acute onset back pain. Onset was yesterday while he was trying to clean his and his wife's car.  5/10, aggravated by movement and elevated by rest.  He tried some Advil without much relief. Denies saddle anesthesia, bowel or bladder incontinence, fever, chills, numbness or tingling sensation in bilateral lower extremities, weakness in either extremities. Past Medical History:   Diagnosis Date    Hypercholesterolemia     Hypertension      Past Surgical History:   Procedure Laterality Date    HX ORTHOPAEDIC      Age [age]: Cut 5 tendons on glass R forearm-> nl now    HX WISDOM TEETH EXTRACTION  2005     Allergies   Allergen Reactions    Benadryl [Diphenhydramine Hcl] Anxiety     Severe panic attack     Current Outpatient Medications   Medication Sig Dispense Refill    meloxicam (MOBIC) 7.5 mg tablet Take 1 Tab by mouth daily for 15 days. 15 Tab 0    lisinopriL (PRINIVIL, ZESTRIL) 10 mg tablet Take 1 Tab by mouth daily for 90 days. Indications: high blood pressure 90 Tab 2    omega 3-DHA-EPA-fish oil (FISH OIL) 1,000 mg (120 mg-180 mg) capsule Take 1 Cap by mouth daily.        Social History     Socioeconomic History    Marital status: UNKNOWN     Spouse name: Not on file    Number of children: Not on file    Years of education: Not on file    Highest education level: Not on file   Tobacco Use    Smoking status: Former Smoker     Types: Cigarettes    Smokeless tobacco: Never Used   Substance and Sexual Activity    Alcohol use: Yes     Alcohol/week: 8.0 standard drinks     Types: 8 Cans of beer per week     Frequency: 2-4 times a month     Drinks per session: 1 or 2     Binge frequency: Weekly    Drug use: Never    Sexual activity: Yes     Partners: Female     Family History   Problem Relation Age of Onset    Diabetes Mother     Hypertension Mother     Elevated Lipids Father     COPD Father     Heart Disease Father     Kidney Disease Father     Cancer Father         throat    Hypertension Father     No Known Problems Sister     No Known Problems Brother     No Known Problems Sister     No Known Problems Brother        Review of Systems  ROS is unremarkable except for ones highlighted in bold.    Constitutional: negative for fevers, chills, anorexia and weight loss  Eyes:   negative for visual disturbance and irritation  ENT:   negative for tinnitus,sore throat,nasal congestion,ear pain,hoarseness  Respiratory:  negative for cough, hemoptysis, dyspnea,wheezing  CV:   negative for chest pain, palpitations, lower extremity edema  GI:   negative for nausea, vomiting, diarrhea, abdominal pain,melena  Endo:               negative for polyuria,polydipsia,polyphagia,heat intolerance  Genitourinary: negative for frequency, dysuria and hematuria  Integumentary: negative for rash and pruritus  Hematologic:  negative for easy bruising and gum/nose bleeding  Musculoskel: negative for myalgias, arthralgias, back pain, muscle weakness, joint pain  Neurological:  negative for headaches, dizziness, vertigo, memory problems and gait   Behavl/Psych: negative for feelings of anxiety, depression, mood changes  ROS otherwise negative     Objective:  Visit Vitals  /84 (BP 1 Location: Left arm, BP Patient Position: Sitting)   Pulse 86   Temp 97.5 °F (36.4 °C)   Resp 16   Ht 6' (1.829 m)   Wt 269 lb (122 kg)   SpO2 96%   BMI 36.48 kg/m²     Physical Exam:   General appearance - alert, well appearing, and in no distress  Mental status - alert, oriented to person, place, and time  EYE-MARK, EOMI, fundi normal, corneas normal, no foreign bodies  ENT-ENT exam normal, no neck nodes or sinus tenderness  Nose - normal and patent, no erythema, discharge or polyps  Mouth - mucous membranes moist, pharynx normal without lesions  Neck - supple, no significant adenopathy   Chest - clear to auscultation, no wheezes, rales or rhonchi, symmetric air entry   Heart - normal rate, regular rhythm, normal S1, S2, no murmurs, rubs, clicks or gallops   Abdomen - soft, nontender, nondistended, no masses or organomegaly  Lymph- no adenopathy palpable  Ext-peripheral pulses normal, no pedal edema, no clubbing or cyanosis  Skin-Warm and dry. no hyperpigmentation, vitiligo, or suspicious lesions  Neuro -alert, oriented, normal speech, no focal findings or movement disorder noted  Musculoskeletal- FROM, no bony abnormalities, no point tenderness    Lab Review:  Results for orders placed or performed in visit on 06/26/20   AMB POC LIPID PROFILE   Result Value Ref Range    Cholesterol (POC) 248     Triglycerides (POC) 171     HDL Cholesterol (POC) 43     Non-HDL Cholesterol 205     LDL Cholesterol (POC) 171 MG/DL    TChol/HDL Ratio (POC) 5.7    AMB POC HEMOGLOBIN A1C   Result Value Ref Range    Hemoglobin A1c (POC) 5.4 %   AMB POC GLUCOSE BLOOD, BY GLUCOSE MONITORING DEVICE   Result Value Ref Range    Glucose  mg/dL        Documenation Review:    Assessment/Plan:    Diagnoses and all orders for this visit:    1. Essential hypertension  -     CBC WITH AUTOMATED DIFF  -     METABOLIC PANEL, COMPREHENSIVE  -     lisinopriL (PRINIVIL, ZESTRIL) 10 mg tablet; Take 1 Tab by mouth daily for 90 days. Indications: high blood pressure    2. Dyslipidemia  -     LIPID PANEL    3. Severe obesity ([address]    4. Screening for diabetes mellitus    5. Screening for thyroid disorder  -     TSH 3RD GENERATION    6.  Acute bilateral low back pain without sciatica  -     meloxicam (MOBIC) 7.5 mg tablet; Take 1 Tab by mouth daily for 15 days. Back stretching exercises given to patient. He will take meloxicam as needed. If his pain does not improve over the next 2 weeks will refer to physical therapy. I will call with lab results and make further recommendations or adjustments if necessary. Discussed lifestyle modifications including Na restriction, low carb/fat diet, weight reduction and exercise (at least a walking program). ICD-10-CM ICD-9-CM    1. Essential hypertension  I10 401.9 CBC WITH AUTOMATED DIFF      METABOLIC PANEL, COMPREHENSIVE      lisinopriL (PRINIVIL, ZESTRIL) 10 mg tablet   2. Dyslipidemia  E78.5 272.4 LIPID PANEL   3. Severe obesity ([address]  E66.01 278.01    4. Screening for diabetes mellitus  Z13.1 V77.1 CANCELED: HEMOGLOBIN A1C W/O EAG   5. Screening for thyroid disorder  Z13.29 V77.0 TSH 3RD GENERATION   6. Acute bilateral low back pain without sciatica  M54.5 724.2 meloxicam (MOBIC) 7.5 mg tablet     338.19              I have reviewed with the patient details of the assessment and plan and all questions were answered. Relevent patient education was performed. Verbal and/or written instructions (see AVS) provided. The most recent lab findings were reviewed with the patient. Plan was discussed with patient who verbally expressed understanding. An After Visit Summary was printed and given to the patient.     Cherlynn Sever, MD

## 2020-09-08 NOTE — PROGRESS NOTES
Aleax Ko. is a 41 y.o. male presenting for Establish Care and Back Pain  . 1. Have you been to the ER, urgent care clinic since your last visit? Hospitalized since your last visit? No    2. Have you seen or consulted any other health care providers outside of the [address] [place] [address] since your last visit? Include any pap smears or colon screening. No    Fall Risk Assessment, last 12 mths 9/8/2020   Able to walk? Yes   Fall in past 12 months? No         Abuse Screening Questionnaire 9/8/2020   Do you ever feel afraid of your partner? N   Are you in a relationship with someone who physically or mentally threatens you? N   Is it safe for you to go home? Y       3 most recent PHQ Screens 9/8/2020   PHQ Not Done -   Little interest or pleasure in doing things Not at all   Feeling down, depressed, irritable, or hopeless Not at all   Total Score PHQ 2 0       There are no discontinued medications.

## 2020-09-10 LAB — TSH SERPL DL<=0.05 MIU/L-ACNC: 0.64 UIU/ML (ref 0.34–5.6)

## 2020-11-06 ENCOUNTER — OFFICE VISIT (OUTPATIENT)
Dept: URGENT CARE | Age: 40
End: 2020-11-06
Payer: COMMERCIAL

## 2020-11-06 VITALS — RESPIRATION RATE: 18 BRPM | TEMPERATURE: 98.1 F | OXYGEN SATURATION: 96 % | HEART RATE: 77 BPM

## 2020-11-06 DIAGNOSIS — Z20.822 ENCOUNTER FOR LABORATORY TESTING FOR COVID-19 VIRUS: Primary | ICD-10-CM

## 2020-11-06 PROCEDURE — 99202 OFFICE O/P NEW SF 15 MIN: CPT | Performed by: NURSE PRACTITIONER

## 2020-11-07 NOTE — PROGRESS NOTES
Patient presents with a request for COVID Testing. He was exposed to a COVID positive person. He is asymptomatic. This patient was seen in Flu Clinic at [address] [place] [address] [facility] while in their vehicle due to COVID-19 pandemic with PPE and focused examination in order to decrease community viral transmission. The patient/guardian gave verbal consent to treat. The history is provided by the patient. Past Medical History:   Diagnosis Date    Hypercholesterolemia     Hypertension         Past Surgical History:   Procedure Laterality Date    HX ORTHOPAEDIC      Age [age]: Cut 5 tendons on glass R forearm-> nl now    HX WISDOM TEETH EXTRACTION  2005         Family History   Problem Relation Age of Onset    Diabetes Mother     Hypertension Mother    [employer] Elevated Lipids Father     COPD Father     Heart Disease Father     Kidney Disease Father     Cancer Father         throat    Hypertension Father     No Known Problems Sister     No Known Problems Brother     No Known Problems Sister     No Known Problems Brother         Social History     Socioeconomic History    Marital status: UNKNOWN     Spouse name: Not on file    Number of children: Not on file    Years of education: Not on file    Highest education level: Not on file   Occupational History    Not on file   Social Needs    Financial resource strain: Not on file    Food insecurity     Worry: Not on file     Inability: Not on file    Transportation needs     Medical: Not on file     Non-medical: Not on file   Tobacco Use    Smoking status: Former Smoker     Types: Cigarettes    Smokeless tobacco: Never Used   Substance and Sexual Activity    Alcohol use:  Yes     Alcohol/week: 8.0 standard drinks     Types: 8 Cans of beer per week     Frequency: 2-4 times a month     Drinks per session: 1 or 2     Binge frequency: Weekly    Drug use: Never    Sexual activity: Yes     Partners: Female   Lifestyle    Physical activity Days per week: Not on file     Minutes per session: Not on file    Stress: Not on file   Relationships    Social connections     Talks on phone: Not on file     Gets together: Not on file     Attends Jainism service: Not on file     Active member of club or organization: Not on file     Attends meetings of clubs or organizations: Not on file     Relationship status: Not on file    Intimate partner violence     Fear of current or ex partner: Not on file     Emotionally abused: Not on file     Physically abused: Not on file     Forced sexual activity: Not on file   Other Topics Concern    Not on file   Social History Narrative    Not on file                ALLERGIES: Benadryl [diphenhydramine hcl]    Review of Systems   Constitutional: Negative for activity change, appetite change, chills and fever. HENT: Negative for congestion, postnasal drip, rhinorrhea, sinus pressure and sinus pain. Respiratory: Negative for cough, chest tightness and shortness of breath. Cardiovascular: Negative for chest pain. Gastrointestinal: Negative for diarrhea, nausea and vomiting. There were no vitals filed for this visit. Physical Exam  Constitutional:       General: He is not in acute distress. Appearance: He is well-developed. He is not ill-appearing or diaphoretic. HENT:      Nose: Nose normal. No congestion or rhinorrhea. Mouth/Throat:      Mouth: Mucous membranes are moist.      Pharynx: No oropharyngeal exudate or posterior oropharyngeal erythema. Cardiovascular:      Rate and Rhythm: Normal rate. Pulmonary:      Effort: Pulmonary effort is normal. No respiratory distress. Neurological:      Mental Status: He is alert. Psychiatric:         Behavior: Behavior is cooperative. MDM    ICD-10-CM ICD-9-CM    1. Encounter for laboratory testing for COVID-19 virus  Z20.828 V01.79 NOVEL CORONAVIRUS (COVID-19)     No orders of the defined types were placed in this encounter.     No results found for any visits on 11/06/20. The patients condition was discussed with the patient and they understand. The patient is to follow up with primary care doctor. If signs and symptoms become worse the pt is to go to the ER. The patient is to take medications as prescribed.      Procedures

## 2020-11-08 LAB — SARS-COV-2, NAA: NOT DETECTED

## 2020-12-07 NOTE — PROGRESS NOTES
Tennille Moreno. is a 41 y.o. male and presents with Complete Physical      Subjective:  Patient comes in today to establish care. His father is also a patient of mine. He works as a . His wife is in attendance with him. Patient comes in today for annual physical examination. He has a history of hyperlipidemia with  and hypertension. He was started on low-dose of statin. Has been trying to modify his diet. Weight remains the same. His blood pressure is well controlled on lisinopril. He has been doing fairly well. He is up-to-date on his vaccinations. He will be getting his flu shot today. Got Tdap in June 2020. He denies any family history of prostate or colon cancer. Past Medical History:   Diagnosis Date    Hypercholesterolemia     Hypertension      Past Surgical History:   Procedure Laterality Date    HX ORTHOPAEDIC      Age [age]: Cut 5 tendons on glass R forearm-> nl now    HX WISDOM TEETH EXTRACTION  2005     Allergies   Allergen Reactions    Benadryl [Diphenhydramine Hcl] Anxiety     Severe panic attack     Current Outpatient Medications   Medication Sig Dispense Refill    lisinopriL (PRINIVIL, ZESTRIL) 10 mg tablet Take 1 Tab by mouth daily for 90 days. Indications: high blood pressure 90 Tab 2    rosuvastatin (CRESTOR) 5 mg tablet Take 1 Tab by mouth nightly for 90 days. Indications: excessive fat in the blood 90 Tab 0    omega 3-DHA-EPA-fish oil (FISH OIL) 1,000 mg (120 mg-180 mg) capsule Take 1 Cap by mouth daily. Social History     Socioeconomic History    Marital status: UNKNOWN     Spouse name: Not on file    Number of children: Not on file    Years of education: Not on file    Highest education level: Not on file   Tobacco Use    Smoking status: Former Smoker     Types: Cigarettes    Smokeless tobacco: Never Used   Substance and Sexual Activity    Alcohol use:  Yes     Alcohol/week: 8.0 standard drinks     Types: 8 Cans of beer per week     Frequency: 2-4 times a month     Drinks per session: 1 or 2     Binge frequency: Weekly    Drug use: Never    Sexual activity: Yes     Partners: Female     Family History   Problem Relation Age of Onset    Diabetes Mother     Hypertension Mother     Elevated Lipids Father     COPD Father     Heart Disease Father     Kidney Disease Father     Cancer Father         throat    Hypertension Father     No Known Problems Sister     No Known Problems Brother     No Known Problems Sister     No Known Problems Brother        Review of Systems  ROS is unremarkable except for ones highlighted in bold.    Constitutional: negative for fevers, chills, anorexia and weight loss  Eyes:   negative for visual disturbance and irritation  ENT:   negative for tinnitus,sore throat,nasal congestion,ear pain,hoarseness  Respiratory:  negative for cough, hemoptysis, dyspnea,wheezing  CV:   negative for chest pain, palpitations, lower extremity edema  GI:   negative for nausea, vomiting, diarrhea, abdominal pain,melena  Endo:               negative for polyuria,polydipsia,polyphagia,heat intolerance  Genitourinary: negative for frequency, dysuria and hematuria  Integumentary: negative for rash and pruritus  Hematologic:  negative for easy bruising and gum/nose bleeding  Musculoskel: negative for myalgias, arthralgias, back pain, muscle weakness, joint pain  Neurological:  negative for headaches, dizziness, vertigo, memory problems and gait   Behavl/Psych: negative for feelings of anxiety, depression, mood changes  ROS otherwise negative     Objective:  Visit Vitals  /80 (BP 1 Location: Left arm, BP Patient Position: Sitting)   Pulse 84   Temp 99 °F (37.2 °C)   Resp 14   Ht 6' (1.829 m)   Wt 275 lb (124.7 kg)   SpO2 99%   BMI 37.30 kg/m²     Physical Exam:   General appearance - alert, well appearing, and in no distress  Mental status - alert, oriented to person, place, and time  EYE-MARK, EOMI, fundi normal, corneas normal, no foreign bodies  ENT-ENT exam normal, no neck nodes or sinus tenderness  Nose - normal and patent, no erythema, discharge or polyps  Mouth - mucous membranes moist, pharynx normal without lesions  Neck - supple, no significant adenopathy   Chest - clear to auscultation, no wheezes, rales or rhonchi, symmetric air entry   Heart - normal rate, regular rhythm, normal S1, S2, no murmurs, rubs, clicks or gallops   Abdomen - soft, nontender, nondistended, no masses or organomegaly  Lymph- no adenopathy palpable  Ext-peripheral pulses normal, no pedal edema, no clubbing or cyanosis  Skin-Warm and dry. no hyperpigmentation, vitiligo, or suspicious lesions  Neuro -alert, oriented, normal speech, no focal findings or movement disorder noted  Musculoskeletal- FROM, no bony abnormalities, no point tenderness    Lab Review:  Results for orders placed or performed in visit on 11/06/20   NOVEL CORONAVIRUS (COVID-19)   Result Value Ref Range    SARS-CoV-2, MAXIM Not Detected Not Detected        Documenation Review:    Assessment/Plan:    Diagnoses and all orders for this visit:    1. Encounter for annual physical exam    2. Essential hypertension  -     CBC W/O DIFF  -     METABOLIC PANEL, COMPREHENSIVE    3. Hypercholesterolemia  -     LIPID PANEL    4. Severe obesity ([address]    5. Acute bilateral low back pain without sciatica    6. Dyslipidemia    7. Needs flu shot  -     INFLUENZA VIRUS VAC QUAD,SPLIT,PRESV FREE SYRINGE IM    8. Vitamin D deficiency  -     VITAMIN D, 25 HYDROXY      Patient is up to date on all health screenings and immunization. Continue with meds. Will check lipid panel today to titrate dose of Crestor. I will call with lab results and make further recommendations or adjustments if necessary. Discussed lifestyle modifications including Na restriction, low carb/fat diet, weight reduction and exercise (at least a walking program). ICD-10-CM ICD-9-CM    1.  Encounter for annual physical exam  Z00.00 V70.0    2. Essential hypertension  I10 401.9 CBC W/O DIFF      METABOLIC PANEL, COMPREHENSIVE   3. Hypercholesterolemia  E78.00 272.0 LIPID PANEL   4. Severe obesity ([address]  E66.01 278.01    5. Acute bilateral low back pain without sciatica  M54.5 724.2      338.19    6. Dyslipidemia  E78.5 272.4    7. Needs flu shot  Z23 V04.81 INFLUENZA VIRUS VAC QUAD,SPLIT,PRESV FREE SYRINGE IM   8. Vitamin D deficiency  E55.9 268.9 VITAMIN D, 25 HYDROXY             I have reviewed with the patient details of the assessment and plan and all questions were answered. Relevent patient education was performed. Verbal and/or written instructions (see AVS) provided. The most recent lab findings were reviewed with the patient. Plan was discussed with patient who verbally expressed understanding. An After Visit Summary was printed and given to the patient.     Guillermina Escobar MD

## 2020-12-08 ENCOUNTER — OFFICE VISIT (OUTPATIENT)
Dept: INTERNAL MEDICINE CLINIC | Age: 40
End: 2020-12-08
Payer: COMMERCIAL

## 2020-12-08 VITALS
HEART RATE: 84 BPM | SYSTOLIC BLOOD PRESSURE: 122 MMHG | WEIGHT: 275 LBS | BODY MASS INDEX: 37.25 KG/M2 | DIASTOLIC BLOOD PRESSURE: 80 MMHG | TEMPERATURE: 99 F | HEIGHT: 72 IN | OXYGEN SATURATION: 99 % | RESPIRATION RATE: 14 BRPM

## 2020-12-08 DIAGNOSIS — E78.00 HYPERCHOLESTEROLEMIA: ICD-10-CM

## 2020-12-08 DIAGNOSIS — E55.9 VITAMIN D DEFICIENCY: ICD-10-CM

## 2020-12-08 DIAGNOSIS — M54.50 ACUTE BILATERAL LOW BACK PAIN WITHOUT SCIATICA: ICD-10-CM

## 2020-12-08 DIAGNOSIS — E78.5 DYSLIPIDEMIA: ICD-10-CM

## 2020-12-08 DIAGNOSIS — E55.9 VITAMIN D DEFICIENCY: Primary | ICD-10-CM

## 2020-12-08 DIAGNOSIS — I10 ESSENTIAL HYPERTENSION: ICD-10-CM

## 2020-12-08 DIAGNOSIS — Z00.00 ENCOUNTER FOR ANNUAL PHYSICAL EXAM: Primary | ICD-10-CM

## 2020-12-08 DIAGNOSIS — E66.01 SEVERE OBESITY (HCC): ICD-10-CM

## 2020-12-08 DIAGNOSIS — Z23 NEEDS FLU SHOT: ICD-10-CM

## 2020-12-08 LAB
25(OH)D3 SERPL-MCNC: 18 NG/ML (ref 30–96)
A-G RATIO,AGRAT: 1.3 RATIO
ALBUMIN SERPL-MCNC: 4.2 G/DL (ref 3.9–5.4)
ALP SERPL-CCNC: 51 U/L (ref 38–126)
ALT SERPL-CCNC: 23 U/L (ref 0–50)
ANION GAP SERPL CALC-SCNC: 9 MMOL/L
AST SERPL W P-5'-P-CCNC: 26 U/L (ref 14–36)
BILIRUB SERPL-MCNC: 0.5 MG/DL (ref 0.2–1.3)
BUN SERPL-MCNC: 13 MG/DL (ref 9–20)
BUN/CREATININE RATIO,BUCR: 11 RATIO
CALCIUM SERPL-MCNC: 9.5 MG/DL (ref 8.4–10.2)
CHLORIDE SERPL-SCNC: 103 MMOL/L (ref 98–107)
CHOL/HDL RATIO,CHHD: 5 RATIO (ref 0–4)
CHOLEST SERPL-MCNC: 180 MG/DL (ref 0–200)
CO2 SERPL-SCNC: 30 MMOL/L (ref 22–32)
CREAT SERPL-MCNC: 1.2 MG/DL (ref 0.8–1.5)
ERYTHROCYTE [DISTWIDTH] IN BLOOD BY AUTOMATED COUNT: 13.7 %
GLOBULIN,GLOB: 3.3
GLUCOSE SERPL-MCNC: 115 MG/DL (ref 75–110)
HCT VFR BLD AUTO: 46.1 % (ref 37–51)
HDLC SERPL-MCNC: 40 MG/DL (ref 35–130)
HGB BLD-MCNC: 14.8 G/DL (ref 12–18)
LDL/HDL RATIO,LDHD: 3 RATIO
LDLC SERPL CALC-MCNC: 114 MG/DL (ref 0–130)
MCH RBC QN AUTO: 28.2 PG (ref 26–32)
MCHC RBC AUTO-ENTMCNC: 32.1 G/DL (ref 30–36)
MCV RBC AUTO: 88 FL (ref 80–97)
PLATELET # BLD AUTO: 376 K/UL (ref 140–440)
POTASSIUM SERPL-SCNC: 4.4 MMOL/L (ref 3.6–5)
PROT SERPL-MCNC: 7.5 G/DL (ref 6.3–8.2)
RBC # BLD AUTO: 5.24 M/UL (ref 4.2–6.3)
SODIUM SERPL-SCNC: 142 MMOL/L (ref 137–145)
TRIGL SERPL-MCNC: 128 MG/DL (ref 0–200)
VLDLC SERPL CALC-MCNC: 26 MG/DL
WBC # BLD AUTO: 6.8 K/UL (ref 4.1–10.9)

## 2020-12-08 PROCEDURE — 80053 COMPREHEN METABOLIC PANEL: CPT | Performed by: INTERNAL MEDICINE

## 2020-12-08 PROCEDURE — 85027 COMPLETE CBC AUTOMATED: CPT | Performed by: INTERNAL MEDICINE

## 2020-12-08 PROCEDURE — 82306 VITAMIN D 25 HYDROXY: CPT | Performed by: INTERNAL MEDICINE

## 2020-12-08 PROCEDURE — 90686 IIV4 VACC NO PRSV 0.5 ML IM: CPT

## 2020-12-08 PROCEDURE — 80061 LIPID PANEL: CPT | Performed by: INTERNAL MEDICINE

## 2020-12-08 PROCEDURE — 99396 PREV VISIT EST AGE 40-64: CPT | Performed by: INTERNAL MEDICINE

## 2020-12-08 PROCEDURE — 99214 OFFICE O/P EST MOD 30 MIN: CPT | Performed by: INTERNAL MEDICINE

## 2020-12-08 PROCEDURE — 90471 IMMUNIZATION ADMIN: CPT

## 2020-12-08 RX ORDER — ERGOCALCIFEROL 1.25 MG/1
50000 CAPSULE ORAL
Qty: 12 CAP | Refills: 0 | Status: SHIPPED | OUTPATIENT
Start: 2020-12-08 | End: 2021-03-01

## 2020-12-08 RX ORDER — ROSUVASTATIN CALCIUM 5 MG/1
5 TABLET, COATED ORAL
Qty: 90 TAB | Refills: 2 | Status: CANCELLED | OUTPATIENT
Start: 2020-12-08 | End: 2021-03-08

## 2020-12-08 NOTE — PATIENT INSTRUCTIONS
Vaccine Information Statement    Influenza (Flu) Vaccine (Inactivated or Recombinant): What You Need to Know    Many Vaccine Information Statements are available in Kiswahili and other languages. See www.immunize.org/vis  Hojas de información sobre vacunas están disponibles en español y en muchos otros idiomas. Visite www.immunize.org/vis    1. Why get vaccinated? Influenza vaccine can prevent influenza (flu). Flu is a contagious disease that spreads around the [facility] every year, usually between October and May. Anyone can get the flu, but it is more dangerous for some people. Infants and young children, people 72years of age and older, pregnant women, and people with certain health conditions or a weakened immune system are at greatest risk of flu complications. Pneumonia, bronchitis, sinus infections and ear infections are examples of flu-related complications. If you have a medical condition, such as heart disease, cancer or diabetes, flu can make it worse. Flu can cause fever and chills, sore throat, muscle aches, fatigue, cough, headache, and runny or stuffy nose. Some people may have vomiting and diarrhea, though this is more common in children than adults. Each year thousands of people in the [facility] die from flu, and many more are hospitalized. Flu vaccine prevents millions of illnesses and flu-related visits to the doctor each year. 2. Influenza vaccines     CDC recommends everyone 10months of age and older get vaccinated every flu season. Children 6 months through 6years of age may need 2 doses during a single flu season. Everyone else needs only 1 dose each flu season. It takes about 2 weeks for protection to develop after vaccination. There are many flu viruses, and they are always changing. Each year a new flu vaccine is made to protect against three or four viruses that are likely to cause disease in the upcoming flu season.  Even when the vaccine doesnt exactly match these viruses, it may still provide some protection. Influenza vaccine does not cause flu. Influenza vaccine may be given at the same time as other vaccines. 3. Talk with your health care provider    Tell your vaccine provider if the person getting the vaccine:   Has had an allergic reaction after a previous dose of influenza vaccine, or has any severe, life-threatening allergies.  Has ever had Guillain-Barré Syndrome (also called GBS). In some cases, your health care provider may decide to postpone influenza vaccination to a future visit. People with minor illnesses, such as a cold, may be vaccinated. People who are moderately or severely ill should usually wait until they recover before getting influenza vaccine. Your health care provider can give you more information. 4. Risks of a reaction     Soreness, redness, and swelling where shot is given, fever, muscle aches, and headache can happen after influenza vaccine.  There may be a very small increased risk of Guillain-Barré Syndrome (GBS) after inactivated influenza vaccine (the flu shot). [facility] children who get the flu shot along with pneumococcal vaccine (PCV13), and/or DTaP vaccine at the same time might be slightly more likely to have a seizure caused by fever. Tell your health care provider if a child who is getting flu vaccine has ever had a seizure. People sometimes faint after medical procedures, including vaccination. Tell your provider if you feel dizzy or have vision changes or ringing in the ears. As with any medicine, there is a very remote chance of a vaccine causing a severe allergic reaction, other serious injury, or death. 5. What if there is a serious problem? An allergic reaction could occur after the vaccinated person leaves the clinic.  If you see signs of a severe allergic reaction (hives, swelling of the face and throat, difficulty breathing, a fast heartbeat, dizziness, or weakness), call 9-1-1 and get the person to the nearest hospital.    For other signs that concern you, call your health care provider. Adverse reactions should be reported to the Vaccine Adverse Event Reporting System (VAERS). Your health care provider will usually file this report, or you can do it yourself. Visit the VAERS website at www.vaers. [facility].gov or call 4-665.456.6756. VAERS is only for reporting reactions, and VAERS staff do not give medical advice. 6. The National Vaccine Injury Compensation Program    The [facility] Vaccine Injury Compensation Program (VICP) is a federal program that was created to compensate people who may have been injured by certain vaccines. Visit the VICP website at www.[facility].gov/vaccinecompensation or call 8-926.142.9055 to learn about the program and about filing a claim. There is a time limit to file a claim for compensation. 7. How can I learn more?  Ask your health care provider.  Call your local or state health department.  Contact the Centers for Disease Control and Prevention (CDC):  - Call 6-776.562.8832 (1-800-CDC-INFO) or  - Visit CDCs influenza website at www.cdc.gov/flu    Vaccine Information Statement (Interim)  Inactivated Influenza Vaccine   8/15/2019  42 JON Woodward [id number]   Department of Health and Human Services  Centers for Disease Control and Prevention    Office Use Only

## 2020-12-08 NOTE — LETTER
NOTIFICATION RETURN TO WORK / SCHOOL 
 
12/8/2020 9:04 AM 
 
Mr. Thiago Bay De Postas [address] [facility] [address] [id number] To Whom It May Concern: 
 
July Whitaker. is currently under the care of [facility]. He will return to work/school on: 12/8/2020. Patient was evaluated by me today. He is in good health. He is able to resume work on 12/8/2020. If there are questions or concerns please have the patient contact our office.  
 
 
 
Sincerely, 
 
 
Estefania Sanders MD

## 2020-12-08 NOTE — PROGRESS NOTES
Le Mckeon is a 41 y.o. male presenting for Complete Physical  .     1. Have you been to the ER, urgent care clinic since your last visit? Hospitalized since your last visit? Yes When: 11/2020 Where: urgent care Reason for visit: covid test (negative)    2. Have you seen or consulted any other health care providers outside of the [address] [place] [address] since your last visit? Include any pap smears or colon screening. No    Fall Risk Assessment, last 12 mths 9/8/2020   Able to walk? Yes   Fall in past 12 months? No         Abuse Screening Questionnaire 9/8/2020   Do you ever feel afraid of your partner? N   Are you in a relationship with someone who physically or mentally threatens you? N   Is it safe for you to go home? Y       3 most recent PHQ Screens 9/8/2020   PHQ Not Done -   Little interest or pleasure in doing things Not at all   Feeling down, depressed, irritable, or hopeless Not at all   Total Score PHQ 2 0       There are no discontinued medications. After obtaining consent, and per orders of Dr. Belle Olson, injection of Quadrivalent  given by Michelle Douglass LPN.

## 2020-12-18 DIAGNOSIS — E78.5 DYSLIPIDEMIA: ICD-10-CM

## 2020-12-18 RX ORDER — ROSUVASTATIN CALCIUM 5 MG/1
5 TABLET, COATED ORAL
Qty: 90 TAB | Refills: 0 | Status: SHIPPED | OUTPATIENT
Start: 2020-12-18 | End: 2021-03-14

## 2021-03-01 DIAGNOSIS — E55.9 VITAMIN D DEFICIENCY: ICD-10-CM

## 2021-03-01 RX ORDER — ERGOCALCIFEROL 1.25 MG/1
CAPSULE ORAL
Qty: 12 CAP | Refills: 0 | Status: SHIPPED | OUTPATIENT
Start: 2021-03-01 | End: 2021-05-20

## 2021-03-13 DIAGNOSIS — E78.5 DYSLIPIDEMIA: ICD-10-CM

## 2021-03-14 RX ORDER — ROSUVASTATIN CALCIUM 5 MG/1
TABLET, COATED ORAL
Qty: 90 TAB | Refills: 0 | Status: SHIPPED | OUTPATIENT
Start: 2021-03-14 | End: 2021-06-14

## 2021-04-10 ENCOUNTER — IMMUNIZATION (OUTPATIENT)
Dept: FAMILY MEDICINE CLINIC | Age: 41
End: 2021-04-10
Payer: COMMERCIAL

## 2021-04-10 DIAGNOSIS — Z23 ENCOUNTER FOR IMMUNIZATION: Primary | ICD-10-CM

## 2021-04-10 PROCEDURE — 91300 COVID-19, MRNA, LNP-S, PF, 30MCG/0.3ML DOSE(PFIZER): CPT | Performed by: FAMILY MEDICINE

## 2021-04-10 PROCEDURE — 0001A COVID-19, MRNA, LNP-S, PF, 30MCG/0.3ML DOSE(PFIZER): CPT | Performed by: FAMILY MEDICINE

## 2021-05-01 ENCOUNTER — IMMUNIZATION (OUTPATIENT)
Dept: FAMILY MEDICINE CLINIC | Age: 41
End: 2021-05-01
Payer: COMMERCIAL

## 2021-05-01 DIAGNOSIS — Z23 ENCOUNTER FOR IMMUNIZATION: Primary | ICD-10-CM

## 2021-05-01 PROCEDURE — 0002A COVID-19, MRNA, LNP-S, PF, 30MCG/0.3ML DOSE(PFIZER): CPT | Performed by: FAMILY MEDICINE

## 2021-05-01 PROCEDURE — 91300 COVID-19, MRNA, LNP-S, PF, 30MCG/0.3ML DOSE(PFIZER): CPT | Performed by: FAMILY MEDICINE

## 2021-05-20 DIAGNOSIS — E55.9 VITAMIN D DEFICIENCY: ICD-10-CM

## 2021-05-20 RX ORDER — ERGOCALCIFEROL 1.25 MG/1
CAPSULE ORAL
Qty: 12 CAPSULE | Refills: 0 | Status: SHIPPED | OUTPATIENT
Start: 2021-05-20 | End: 2021-08-18

## 2021-06-01 ENCOUNTER — DOCUMENTATION ONLY (OUTPATIENT)
Dept: INTERNAL MEDICINE CLINIC | Age: 41
End: 2021-06-01

## 2021-06-01 NOTE — PROGRESS NOTES
Patient called stating he needs a physical for his job and it needs to be completed before October. Patient states his insurance will allow one physical per calendar year. He had a physical in December 2020. He states since it is a new year, they will cover this physical as his yearly.  Therefore patient has been scheduled for June 28th at [address] [facility] for his physical.   CB: [phone]

## 2021-06-11 RX ORDER — LISINOPRIL 10 MG/1
TABLET ORAL
Qty: 90 TABLET | Refills: 2 | Status: SHIPPED | OUTPATIENT
Start: 2021-06-11 | End: 2022-01-10 | Stop reason: SDUPTHER

## 2021-06-14 DIAGNOSIS — E78.5 DYSLIPIDEMIA: ICD-10-CM

## 2021-06-14 RX ORDER — ROSUVASTATIN CALCIUM 5 MG/1
TABLET, COATED ORAL
Qty: 90 TABLET | Refills: 0 | Status: SHIPPED | OUTPATIENT
Start: 2021-06-14 | End: 2021-08-18

## 2021-06-27 NOTE — PROGRESS NOTES
Janet Pappas is a 35 y.o. male and presents with Hypertension (6 mo fu) and Cholesterol Problem      Subjective: He works as a . His wife is in attendance with him. Patient comes in today for follow-up. He reports he has been doing good. He has managed to lose 5 pounds since last office visit. Dyslipidemiaon statin. Numbers improved after initiation of statin. He has been tolerating it well without muscle cramps or myalgia. Hypertensionwell-controlled on lisinopril. Denies headache or blurred vision. Vitamin D deficiencyon replacement    Past Medical History:   Diagnosis Date    Hypercholesterolemia     Hypertension      Past Surgical History:   Procedure Laterality Date    HX ORTHOPAEDIC      Age 13/16: Cut 5 tendons on glass R forearm-> nl now    HX WISDOM TEETH EXTRACTION  2005     Allergies   Allergen Reactions    Benadryl [Diphenhydramine Hcl] Anxiety     Severe panic attack     Current Outpatient Medications   Medication Sig Dispense Refill    rosuvastatin (CRESTOR) 5 mg tablet TAKE 1 TABLET BY MOUTH EVERYDAY AT BEDTIME 90 Tablet 0    lisinopriL (PRINIVIL, ZESTRIL) 10 mg tablet TAKE 1 TABLET BY MOUTH EVERY DAY 90 Tablet 2    ergocalciferol (ERGOCALCIFEROL) 1,250 mcg (50,000 unit) capsule TAKE 1 CAP BY MOUTH EVERY 7 DAYS FOR 12 DOSES. INDICATIONS: VITAMIN D DEFICIENCY (HIGH DOSE THERAPY) 12 Capsule 0    omega 3-DHA-EPA-fish oil (FISH OIL) 1,000 mg (120 mg-180 mg) capsule Take 1 Cap by mouth daily. Social History     Socioeconomic History    Marital status: UNKNOWN     Spouse name: Not on file    Number of children: Not on file    Years of education: Not on file    Highest education level: Not on file   Tobacco Use    Smoking status: Former Smoker     Types: Cigarettes    Smokeless tobacco: Never Used   Vaping Use    Vaping Use: Never used   Substance and Sexual Activity    Alcohol use:  Yes     Alcohol/week: 8.0 standard drinks     Types: 8 Cans of beer per week    Drug use: Never    Sexual activity: Yes     Partners: Female     Social Determinants of Health     Financial Resource Strain:     Difficulty of Paying Living Expenses:    Food Insecurity:     Worried About Running Out of Food in the Last Year:     [address] in the Last Year:    Transportation Needs:     Lack of Transportation (Medical):  Lack of Transportation (Non-Medical):    Physical Activity:     Days of Exercise per Week:     Minutes of Exercise per Session:    Stress:     Feeling of Stress :    Social Connections:     Frequency of Communication with Friends and Family:     Frequency of Social Gatherings with Friends and Family:     Attends Restoration[employer] Services:     Active Member of Clubs or Organizations:     Attends Club or Organization Meetings:     Marital Status:      Family History   Problem Relation Age of Onset    Diabetes Mother     Hypertension Mother     Elevated Lipids Father     COPD Father     Heart Disease Father     Kidney Disease Father     Cancer Father         throat    Hypertension Father     No Known Problems Sister     No Known Problems Brother     No Known Problems Sister     No Known Problems Brother        Review of Systems  ROS is unremarkable except for ones highlighted in bold.    Constitutional: negative for fevers, chills, anorexia and weight loss  Eyes:   negative for visual disturbance and irritation  ENT:   negative for tinnitus,sore throat,nasal congestion,ear pain,hoarseness  Respiratory:  negative for cough, hemoptysis, dyspnea,wheezing  CV:   negative for chest pain, palpitations, lower extremity edema  GI:   negative for nausea, vomiting, diarrhea, abdominal pain,melena  Endo:               negative for polyuria,polydipsia,polyphagia,heat intolerance  Genitourinary: negative for frequency, dysuria and hematuria  Integumentary: negative for rash and pruritus  Hematologic:  negative for easy bruising and gum/nose bleeding  Musculoskel: negative for myalgias, arthralgias, back pain, muscle weakness, joint pain  Neurological:  negative for headaches, dizziness, vertigo, memory problems and gait   Behavl/Psych: negative for feelings of anxiety, depression, mood changes  ROS otherwise negative     Objective:  Visit Vitals  /84 (BP 1 Location: Left upper arm, BP Patient Position: Sitting, BP Cuff Size: Adult)   Pulse 69   Temp 97.7 °F (36.5 °C)   Resp 14   Ht 6' (1.829 m)   Wt 270 lb (122.5 kg)   SpO2 99%   BMI 36.62 kg/m²     Physical Exam:   General appearance - alert, well appearing, and in no distress  Mental status - alert, oriented to person, place, and time  EYE-MARK, EOMI, fundi normal, corneas normal, no foreign bodies  ENT-ENT exam normal, no neck nodes or sinus tenderness  Nose - normal and patent, no erythema, discharge or polyps  Mouth - mucous membranes moist, pharynx normal without lesions  Neck - supple, no significant adenopathy   Chest - clear to auscultation, no wheezes, rales or rhonchi, symmetric air entry   Heart - normal rate, regular rhythm, normal S1, S2, no murmurs, rubs, clicks or gallops   Abdomen - soft, nontender, nondistended, no masses or organomegaly  Lymph- no adenopathy palpable  Ext-peripheral pulses normal, no pedal edema, no clubbing or cyanosis  Skin-Warm and dry.  no hyperpigmentation, vitiligo, or suspicious lesions  Neuro -alert, oriented, normal speech, no focal findings or movement disorder noted  Musculoskeletal- FROM, no bony abnormalities, no point tenderness    Lab Review:  Results for orders placed or performed in visit on 12/08/20   CBC W/O DIFF   Result Value Ref Range    WBC 6.8 4.1 - 10.9 K/uL    RBC 5.24 4.20 - 6.30 M/uL    HGB 14.8 12.0 - 18.0 g/dL    HCT 46.1 37.0 - 51.0 %    MCH 28.2 26.0 - 32.0 pg    MCHC 32.1 30.0 - 36.0 g/dL    MCV 88.0 80.0 - 97.0 fL    RDW 13.7 %    PLATELET 724.1 208.7 - 963.6 K/uL   METABOLIC PANEL, COMPREHENSIVE   Result Value Ref Range Glucose 115 (H) 75 - 110 mg/dL    BUN 13.0 9.0 - 20.0 mg/dL    Creatinine 1.2 0.8 - 1.5 mg/dL    Sodium 142 137 - 145 mmol/L    Potassium 4.4 3.6 - 5.0 mmol/L    Chloride 103 98 - 107 mmol/L    CO2 30.0 22.0 - 32.0 mmol/L    Calcium 9.5 8.4 - 10.2 mg/dl    Protein, total 7.5 6.3 - 8.2 g/dL    Albumin 4.2 3.9 - 5.4 g/dL    ALT (SGPT) 23 0 - 50 U/L    AST (SGOT) 26.0 14.0 - 36.0 U/L    Alk. phosphatase 51 38 - 126 U/L    Bilirubin, total 0.5 0.2 - 1.3 mg/dL    BUN/Creatinine ratio 11 Ratio    GFR est AA >60 mL/min/1.73m2    GFR est non-AA >60 mL/min/1.73m2    Globulin 3.30     A-G Ratio 1.3 Ratio    Anion gap 9 mmol/L   LIPID PANEL   Result Value Ref Range    Cholesterol, total 180 0 - 200 mg/dL    Triglyceride 128 0 - 200 mg/dL    HDL Cholesterol 40 35 - 130 mg/dL    VLDL 26 mg/dL    LDL, calculated 114 0 - 130 mg/dL    CHOL/HDL Ratio 5 (H) 0 - 4 Ratio    LDL/HDL Ratio 3 Ratio   VITAMIN D, 25 HYDROXY   Result Value Ref Range    VITAMIN D, 25-HYDROXY 18 (L) 30 - 96 ng/mL        Documenation Review:    Assessment/Plan:    Diagnoses and all orders for this visit:    1. Essential hypertension  -     CBC W/O DIFF; Future  -     METABOLIC PANEL, COMPREHENSIVE; Future    2. Dyslipidemia  -     LIPID PANEL; Future  -     CK; Future    3. Encounter for medication monitoring    4. Obesity (BMI 30-39.9)    5. Vitamin D deficiency  -     VITAMIN D, 25 HYDROXY; Future        Continue with meds. Will check lipid panel today to titrate dose of Crestor. I will call with lab results and make further recommendations or adjustments if necessary. Discussed lifestyle modifications including Na restriction, low carb/fat diet, weight reduction and exercise (at least a walking program). ICD-10-CM ICD-9-CM    1. Essential hypertension  I10 401.9 CBC W/O DIFF      METABOLIC PANEL, COMPREHENSIVE   2. Dyslipidemia  E78.5 272.4 LIPID PANEL      CK   3. Encounter for medication monitoring  Z51.81 V58.83    4. Obesity (BMI 30-39. 9)  E66.9 278. 00    5. Vitamin D deficiency  E55.9 268.9 VITAMIN D, 25 HYDROXY         Follow-up and Dispositions    · Return in about 6 months (around 12/28/2021) for CPE-30mins. I have reviewed with the patient details of the assessment and plan and all questions were answered. Relevent patient education was performed. Verbal and/or written instructions (see AVS) provided. The most recent lab findings were reviewed with the patient. Plan was discussed with patient who verbally expressed understanding. An After Visit Summary was printed and given to the patient.     Maged Hill MD

## 2021-06-28 ENCOUNTER — OFFICE VISIT (OUTPATIENT)
Dept: INTERNAL MEDICINE CLINIC | Age: 41
End: 2021-06-28
Payer: COMMERCIAL

## 2021-06-28 VITALS
DIASTOLIC BLOOD PRESSURE: 84 MMHG | TEMPERATURE: 97.7 F | RESPIRATION RATE: 14 BRPM | WEIGHT: 270 LBS | HEIGHT: 72 IN | SYSTOLIC BLOOD PRESSURE: 120 MMHG | HEART RATE: 69 BPM | BODY MASS INDEX: 36.57 KG/M2 | OXYGEN SATURATION: 99 %

## 2021-06-28 DIAGNOSIS — E78.5 DYSLIPIDEMIA: ICD-10-CM

## 2021-06-28 DIAGNOSIS — E66.9 OBESITY (BMI 30-39.9): ICD-10-CM

## 2021-06-28 DIAGNOSIS — Z51.81 ENCOUNTER FOR MEDICATION MONITORING: ICD-10-CM

## 2021-06-28 DIAGNOSIS — I10 ESSENTIAL HYPERTENSION: Primary | ICD-10-CM

## 2021-06-28 DIAGNOSIS — E55.9 VITAMIN D DEFICIENCY: ICD-10-CM

## 2021-06-28 LAB
25(OH)D3 SERPL-MCNC: 59.8 NG/ML (ref 30–100)
ALBUMIN SERPL-MCNC: 3.8 G/DL (ref 3.5–5)
ALBUMIN/GLOB SERPL: 1 {RATIO} (ref 1.1–2.2)
ALP SERPL-CCNC: 57 U/L (ref 45–117)
ALT SERPL-CCNC: 32 U/L (ref 12–78)
ANION GAP SERPL CALC-SCNC: 5 MMOL/L (ref 5–15)
AST SERPL-CCNC: 19 U/L (ref 15–37)
BILIRUB SERPL-MCNC: 0.4 MG/DL (ref 0.2–1)
BUN SERPL-MCNC: 25 MG/DL (ref 6–20)
BUN/CREAT SERPL: 22 (ref 12–20)
CALCIUM SERPL-MCNC: 9.2 MG/DL (ref 8.5–10.1)
CHLORIDE SERPL-SCNC: 104 MMOL/L (ref 97–108)
CHOLEST SERPL-MCNC: 207 MG/DL
CK SERPL-CCNC: 354 U/L (ref 39–308)
CO2 SERPL-SCNC: 31 MMOL/L (ref 21–32)
CREAT SERPL-MCNC: 1.16 MG/DL (ref 0.7–1.3)
ERYTHROCYTE [DISTWIDTH] IN BLOOD BY AUTOMATED COUNT: 13.5 % (ref 11.5–14.5)
GLOBULIN SER CALC-MCNC: 3.8 G/DL (ref 2–4)
GLUCOSE SERPL-MCNC: 120 MG/DL (ref 65–100)
HCT VFR BLD AUTO: 48.2 % (ref 36.6–50.3)
HDLC SERPL-MCNC: 54 MG/DL
HDLC SERPL: 3.8 {RATIO} (ref 0–5)
HGB BLD-MCNC: 14.5 G/DL (ref 12.1–17)
LDLC SERPL CALC-MCNC: 133.6 MG/DL (ref 0–100)
MCH RBC QN AUTO: 27.7 PG (ref 26–34)
MCHC RBC AUTO-ENTMCNC: 30.1 G/DL (ref 30–36.5)
MCV RBC AUTO: 92.2 FL (ref 80–99)
NRBC # BLD: 0 K/UL (ref 0–0.01)
NRBC BLD-RTO: 0 PER 100 WBC
PLATELET # BLD AUTO: 297 K/UL (ref 150–400)
PMV BLD AUTO: 10.9 FL (ref 8.9–12.9)
POTASSIUM SERPL-SCNC: 4.7 MMOL/L (ref 3.5–5.1)
PROT SERPL-MCNC: 7.6 G/DL (ref 6.4–8.2)
RBC # BLD AUTO: 5.23 M/UL (ref 4.1–5.7)
SODIUM SERPL-SCNC: 140 MMOL/L (ref 136–145)
TRIGL SERPL-MCNC: 97 MG/DL (ref ?–150)
VLDLC SERPL CALC-MCNC: 19.4 MG/DL
WBC # BLD AUTO: 7.1 K/UL (ref 4.1–11.1)

## 2021-06-28 PROCEDURE — 99214 OFFICE O/P EST MOD 30 MIN: CPT | Performed by: INTERNAL MEDICINE

## 2021-06-28 NOTE — PATIENT INSTRUCTIONS
Starting a Weight Loss Plan: Care Instructions  Overview     If you're thinking about losing weight, it can be hard to know where to start. Your doctor can help you set up a weight loss plan that best meets your needs. You may want to take a class on nutrition or exercise, or you could join a weight loss support group. If you have questions about how to make changes to your eating or exercise habits, ask your doctor about seeing a registered dietitian or an exercise specialist.  It can be a big challenge to lose weight. But you don't have to make huge changes at once. Make small changes, and stick with them. When those changes become habit, add a few more changes. If you don't think you're ready to make changes right now, try to pick a date in the future. Make an appointment to see your doctor to discuss whether the time is right for you to start a plan. Follow-up care is a key part of your treatment and safety. Be sure to make and go to all appointments, and call your doctor if you are having problems. It's also a good idea to know your test results and keep a list of the medicines you take. How can you care for yourself at home? · Set realistic goals. Many people expect to lose much more weight than is likely. A weight loss of 5% to 10% of your body weight may be enough to improve your health. · Get family and friends involved to provide support. Talk to them about why you are trying to lose weight, and ask them to help. They can help by participating in exercise and having meals with you, even if they may be eating something different. · Find what works best for you. If you do not have time or do not like to cook, a program that offers meal replacement bars or shakes may be better for you. Or if you like to prepare meals, finding a plan that includes daily menus and recipes may be best.  · Ask your doctor about other health professionals who can help you achieve your weight loss goals.   ? A dietitian can help you make healthy changes in your diet. ? An exercise specialist or  can help you develop a safe and effective exercise program.  ? A counselor or psychiatrist can help you cope with issues such as depression, anxiety, or family problems that can make it hard to focus on weight loss. · Consider joining a support group for people who are trying to lose weight. Your doctor can suggest groups in your area. Where can you learn more? Go to http://www.gray.com/  Enter U357 in the search box to learn more about \"Starting a Weight Loss Plan: Care Instructions. \"  Current as of: September 23, 2020               Content Version: 12.8  © 4156-6988 Healthwise, [employer]. Care instructions adapted under license by [facility] (which disclaims liability or warranty for this information). If you have questions about a medical condition or this instruction, always ask your healthcare professional. [address] any warranty or liability for your use of this information.

## 2021-06-28 NOTE — PROGRESS NOTES
Dale Moritz. is a 35 y.o. male presenting for Hypertension (6 mo fu) and Cholesterol Problem  . 1. Have you been to the ER, urgent care clinic since your last visit? Hospitalized since your last visit? No    2. Have you seen or consulted any other health care providers outside of the [address] [place] [address] since your last visit? Include any pap smears or colon screening. No    Fall Risk Assessment, last 12 mths 6/28/2021   Able to walk? Yes   Fall in past 12 months? 0   Do you feel unsteady? 0   Are you worried about falling 0         Abuse Screening Questionnaire 6/28/2021   Do you ever feel afraid of your partner? N   Are you in a relationship with someone who physically or mentally threatens you? N   Is it safe for you to go home? Y       3 most recent PHQ Screens 6/28/2021   PHQ Not Done -   Little interest or pleasure in doing things Not at all   Feeling down, depressed, irritable, or hopeless Not at all   Total Score PHQ 2 0       There are no discontinued medications.

## 2021-08-18 DIAGNOSIS — E78.5 DYSLIPIDEMIA: ICD-10-CM

## 2021-08-18 DIAGNOSIS — E55.9 VITAMIN D DEFICIENCY: ICD-10-CM

## 2021-08-18 RX ORDER — ERGOCALCIFEROL 1.25 MG/1
CAPSULE ORAL
Qty: 12 CAPSULE | Refills: 0 | Status: SHIPPED | OUTPATIENT
Start: 2021-08-18 | End: 2022-01-10 | Stop reason: SDUPTHER

## 2021-08-18 RX ORDER — ROSUVASTATIN CALCIUM 5 MG/1
TABLET, COATED ORAL
Qty: 90 TABLET | Refills: 0 | Status: SHIPPED | OUTPATIENT
Start: 2021-08-18 | End: 2022-01-10 | Stop reason: SDUPTHER

## 2021-09-01 ENCOUNTER — OFFICE VISIT (OUTPATIENT)
Dept: URGENT CARE | Age: 41
End: 2021-09-01
Payer: COMMERCIAL

## 2021-09-01 VITALS — TEMPERATURE: 98.4 F | RESPIRATION RATE: 16 BRPM | HEART RATE: 96 BPM | OXYGEN SATURATION: 95 %

## 2021-09-01 DIAGNOSIS — Z20.822 EXPOSURE TO COVID-19 VIRUS: Primary | ICD-10-CM

## 2021-09-01 DIAGNOSIS — Z20.822 SUSPECTED COVID-19 VIRUS INFECTION: ICD-10-CM

## 2021-09-01 LAB — SARS-COV-2 POC: POSITIVE

## 2021-09-01 PROCEDURE — 99213 OFFICE O/P EST LOW 20 MIN: CPT | Performed by: FAMILY MEDICINE

## 2021-09-01 PROCEDURE — 87426 SARSCOV CORONAVIRUS AG IA: CPT | Performed by: FAMILY MEDICINE

## 2021-09-01 NOTE — PROGRESS NOTES
This patient was seen at [address] [place] [address] Urgent Care while in their vehicle due to COVID-19 pandemic with PPE and focused examination in order to decrease community viral transmission. The patient/guardian gave verbal consent to treat. The history is provided by the patient. Nasal Congestion  This is a new problem. The current episode started more than 2 days ago. The problem occurs daily. The problem has been gradually improving. Pertinent negatives include no chest pain, no headaches and no shortness of breath. Associated symptoms comments: No fever/bodyache. Nothing aggravates the symptoms. Nothing relieves the symptoms. He has tried nothing for the symptoms. Past Medical History:   Diagnosis Date    Hypercholesterolemia     Hypertension         Past Surgical History:   Procedure Laterality Date    HX ORTHOPAEDIC      Age [age]: Cut 5 tendons on glass R forearm-> nl now    HX WISDOM TEETH EXTRACTION  2005         Family History   Problem Relation Age of Onset    Diabetes Mother     Hypertension Mother    [facility] Elevated Lipids Father     COPD Father     Heart Disease Father     Kidney Disease Father     Cancer Father         throat    Hypertension Father     No Known Problems Sister     No Known Problems Brother     No Known Problems Sister     No Known Problems Brother         Social History     Socioeconomic History    Marital status: UNKNOWN     Spouse name: Not on file    Number of children: Not on file    Years of education: Not on file    Highest education level: Not on file   Occupational History    Not on file   Tobacco Use    Smoking status: Former Smoker     Types: Cigarettes    Smokeless tobacco: Never Used   Vaping Use    Vaping Use: Never used   Substance and Sexual Activity    Alcohol use:  Yes     Alcohol/week: 8.0 standard drinks     Types: 8 Cans of beer per week    Drug use: Never    Sexual activity: Yes     Partners: Female   Other Topics Concern    Not on file   Social History Narrative    Not on file     Social Determinants of Health     Financial Resource Strain:     Difficulty of Paying Living Expenses:    Food Insecurity:     Worried About Running Out of Food in the Last Year:     [address] in the Last Year:    Transportation Needs:     Lack of Transportation (Medical):  Lack of Transportation (Non-Medical):    Physical Activity:     Days of Exercise per Week:     Minutes of Exercise per Session:    Stress:     Feeling of Stress :    Social Connections:     Frequency of Communication with Friends and Family:     Frequency of Social Gatherings with Friends and Family:     Attends Jehovah's witness Services:     Active Member of Clubs or Organizations:     Attends Club or Organization Meetings:     Marital Status:    Intimate Partner Violence:     Fear of Current or Ex-Partner:     Emotionally Abused:     Physically Abused:     Sexually Abused: ALLERGIES: Benadryl [diphenhydramine hcl]    Review of Systems   HENT: Positive for congestion and postnasal drip. Respiratory: Negative for shortness of breath. Cardiovascular: Negative for chest pain. Neurological: Negative for headaches. All other systems reviewed and are negative. Vitals:    09/01/21 0951   Pulse: 96   Resp: 16   Temp: 98.4 °F (36.9 °C)   SpO2: 95%       Physical Exam  Vitals and nursing note reviewed. Constitutional:       General: He is not in acute distress. Appearance: He is not ill-appearing. Pulmonary:      Effort: Pulmonary effort is normal. No respiratory distress. Breath sounds: Normal breath sounds. MDM    Procedures      ICD-10-CM ICD-9-CM    1. Exposure to COVID-19 virus  Z20.822 V01.79 [facility] POC SARS-COV-2   2. Suspected COVID-19 virus infection  Z20.822 V01.79 [facility] POC SARS-COV-2     No orders of the defined types were placed in this encounter.     Results for orders placed or performed in visit on 09/01/21   AMB POC SARS-COV-2   Result Value Ref Range    SARS-COV-2 POC Positive (A) Negative     Notified and quarantine x 10 days  Symptomatic rx  Notify contacts    The patients condition was discussed with the patient and they understand. The patient is to follow up with primary care doctor. If signs and symptoms become worse the pt is to go to the ER. The patient is to take medications as prescribed.

## 2021-09-01 NOTE — LETTER
NOTIFICATION RETURN TO WORK / SCHOOL    9/1/2021 10:25 AM    Mr. Ingrid Dhillon.  [address] [phone]      To Whom It May Concern:    Ingrid Dhillon. is currently under the care of [facility]. He will return to work/school on: 9/9/21. If there are questions or concerns please have the patient contact our office.         Sincerely,      Disha Gan MD

## 2021-09-01 NOTE — PROGRESS NOTES
Patient Notified for positive Covid-19  Symptomatic Rx   Follow quarantine guideline as per CDC  Notify contacts to be tested if symptomatic    Advised to follow with PCP and go to ED if worsen

## 2021-09-07 ENCOUNTER — OFFICE VISIT (OUTPATIENT)
Dept: URGENT CARE | Age: 41
End: 2021-09-07
Payer: COMMERCIAL

## 2021-09-07 VITALS — RESPIRATION RATE: 16 BRPM | TEMPERATURE: 98.6 F | OXYGEN SATURATION: 96 % | HEART RATE: 74 BPM

## 2021-09-07 DIAGNOSIS — U07.1 COVID-19: Primary | ICD-10-CM

## 2021-09-07 LAB — SARS-COV-2 POC: NEGATIVE

## 2021-09-07 PROCEDURE — 87426 SARSCOV CORONAVIRUS AG IA: CPT | Performed by: FAMILY MEDICINE

## 2021-09-07 PROCEDURE — 99212 OFFICE O/P EST SF 10 MIN: CPT | Performed by: FAMILY MEDICINE

## 2021-09-07 NOTE — PROGRESS NOTES
This patient was seen at [address] [place] [address] Urgent Care while in their vehicle due to COVID-19 pandemic with PPE and focused examination in order to decrease community viral transmission. The patient/guardian gave verbal consent to treat. Chandrakant Rodríguez. is a 35 y.o. male who presents for COVID-19 testing. Tested positive 6 days ago. Needs negative test in order to return to work. Denies cough, fever, SOB, N/V/D. The history is provided by the patient. Past Medical History:   Diagnosis Date    Hypercholesterolemia     Hypertension         Past Surgical History:   Procedure Laterality Date    HX ORTHOPAEDIC      Age [age]: Cut 5 tendons on glass R forearm-> nl now    HX WISDOM TEETH EXTRACTION  2005         Family History   Problem Relation Age of Onset    Diabetes Mother     Hypertension Mother    [facility] Elevated Lipids Father     COPD Father     Heart Disease Father     Kidney Disease Father     Cancer Father         throat    Hypertension Father     No Known Problems Sister     No Known Problems Brother     No Known Problems Sister     No Known Problems Brother         Social History     Socioeconomic History    Marital status: UNKNOWN     Spouse name: Not on file    Number of children: Not on file    Years of education: Not on file    Highest education level: Not on file   Occupational History    Not on file   Tobacco Use    Smoking status: Former Smoker     Types: Cigarettes    Smokeless tobacco: Never Used   Vaping Use    Vaping Use: Never used   Substance and Sexual Activity    Alcohol use:  Yes     Alcohol/week: 8.0 standard drinks     Types: 8 Cans of beer per week    Drug use: Never    Sexual activity: Yes     Partners: Female   Other Topics Concern    Not on file   Social History Narrative    Not on file     Social Determinants of Health     Financial Resource Strain:     Difficulty of Paying Living Expenses:    Food Insecurity:     Worried About Running Out of Food in the Last Year:    [address] in the Last Year:    Transportation Needs:     Lack of Transportation (Medical):  Lack of Transportation (Non-Medical):    Physical Activity:     Days of Exercise per Week:     Minutes of Exercise per Session:    Stress:     Feeling of Stress :    Social Connections:     Frequency of Communication with Friends and Family:     Frequency of Social Gatherings with Friends and Family:     Attends Confucianist Services:     Active Member of Clubs or Organizations:     Attends Club or Organization Meetings:     Marital Status:    Intimate Partner Violence:     Fear of Current or Ex-Partner:     Emotionally Abused:     Physically Abused:     Sexually Abused: ALLERGIES: Benadryl [diphenhydramine hcl]    Review of Systems   Constitutional: Negative for fever. Respiratory: Negative for cough and shortness of breath. Gastrointestinal: Negative for diarrhea, nausea and vomiting. Vitals:    09/07/21 1526   Pulse: 74   Resp: 16   Temp: 98.6 °F (37 °C)   SpO2: 96%       Physical Exam  Vitals and nursing note reviewed. Constitutional:       General: He is not in acute distress. Appearance: He is well-developed. He is not diaphoretic. Pulmonary:      Effort: Pulmonary effort is normal.   Neurological:      Mental Status: He is alert. Psychiatric:         Behavior: Behavior normal.         Thought Content: Thought content normal.         Judgment: Judgment normal.         MDM    ICD-10-CM ICD-9-CM   1. COVID-19  U07.1 079.89       Orders Placed This Encounter    AMB POC SARS-COV-2     Order Specific Question:   Is this test for diagnosis or screening? Answer:   Screening     Order Specific Question:   Symptomatic for COVID-19 as defined by CDC? Answer:   No     Order Specific Question:   Hospitalized for COVID-19? Answer:   No     Order Specific Question:   Admitted to ICU for COVID-19?      Answer:   No     Order Specific Question:   Employed in healthcare setting? Answer:   Unknown     Order Specific Question:   Resident in a congregate (group) care setting? Answer:   No     Order Specific Question:   Previously tested for COVID-19?      Answer:   Yes        Results for orders placed or performed in visit on 09/07/21   AMB POC SARS-COV-2   Result Value Ref Range    SARS-COV-2 POC Negative Negative         Procedures

## 2021-09-07 NOTE — LETTER
September 7, 2021  Andrade Kaur [id number]    Dear Cheyanne Jarvis: Thank you for requesting access to [employer]. Please follow the instructions below to securely access and download your online medical record. [employer] allows you to send messages to your doctor, view your test results, renew your prescriptions, schedule appointments, and more. How Do I Sign Up? 1. In your internet browser, go to [email or web address]). "[employer]ot. 2. Click on the First Time User? Click Here link in the Sign In box. You will see the New Member Sign Up page. 3. Enter your [employer] Access Code exactly as it appears below. You will not need to use this code after youve completed the sign-up process. If you do not sign up before the expiration date, you must request a new code. [employer] Access Code: [email or web address]  Expires: 10/11/2021  5:34 AM     4. Enter the last four digits of your Social Security Number (xxxx) and Date of Birth (mm/dd/yyyy) as indicated and click Submit. You will be taken to the next sign-up page. 5. Create a [employer] ID. This will be your [employer] login ID and cannot be changed, so think of one that is secure and easy to remember. 6. Create a [employer] password. You can change your password at any time. 7. Enter your Password Reset Question and Answer. This can be used at a later time if you forget your password. 8. Enter your e-mail address. You will receive e-mail notification when new information is available in [address]. 9. Click Sign Up. You can now view and download portions of your medical record. 10. Click the Download Summary menu link to download a portable copy of your medical information. Additional Information    If you have questions, please visit the Frequently Asked Questions section of the [employer] website at [email or web address]). "[employer]/Sensor[employer]/. Remember, [employer] is NOT to be used for urgent needs.  For medical emergencies, dial 911.    Now available from your iPhone and Android!     Sincerely,   [employer]

## 2021-10-20 ENCOUNTER — CLINICAL SUPPORT (OUTPATIENT)
Dept: PRIMARY CARE CLINIC | Age: 41
End: 2021-10-20
Payer: COMMERCIAL

## 2021-10-20 DIAGNOSIS — Z23 NEEDS FLU SHOT: Primary | ICD-10-CM

## 2021-10-20 PROCEDURE — 90471 IMMUNIZATION ADMIN: CPT | Performed by: NURSE PRACTITIONER

## 2021-10-20 PROCEDURE — 90686 IIV4 VACC NO PRSV 0.5 ML IM: CPT | Performed by: NURSE PRACTITIONER

## 2021-10-20 NOTE — PROGRESS NOTES
Chief Complaint   Patient presents with    Immunization/Injection     Here for placement of Influenza vaccine

## 2021-10-20 NOTE — PATIENT INSTRUCTIONS
Vaccine Information Statement    Influenza (Flu) Vaccine (Inactivated or Recombinant): What You Need to Know    Many vaccine information statements are available in Azeri and other languages. See www.immunize.org/vis. Hojas de información sobre vacunas están disponibles en español y en muchos otros idiomas. Visite www.immunize.org/vis. 1. Why get vaccinated? Influenza vaccine can prevent influenza (flu). Flu is a contagious disease that spreads around the [facility] every year, usually between October and May. Anyone can get the flu, but it is more dangerous for some people. Infants and young children, people 72 years and older, pregnant people, and people with certain health conditions or a weakened immune system are at greatest risk of flu complications. Pneumonia, bronchitis, sinus infections, and ear infections are examples of flu-related complications. If you have a medical condition, such as heart disease, cancer, or diabetes, flu can make it worse. Flu can cause fever and chills, sore throat, muscle aches, fatigue, cough, headache, and runny or stuffy nose. Some people may have vomiting and diarrhea, though this is more common in children than adults. In an average year, thousands of people in the [facility] die from flu, and many more are hospitalized. Flu vaccine prevents millions of illnesses and flu-related visits to the doctor each year. 2. Influenza vaccines     CDC recommends everyone 6 months and older get vaccinated every flu season. Children 6 months through 6years of age may need 2 doses during a single flu season. Everyone else needs only 1 dose each flu season. It takes about 2 weeks for protection to develop after vaccination. There are many flu viruses, and they are always changing. Each year a new flu vaccine is made to protect against the influenza viruses believed to be likely to cause disease in the upcoming flu season.  Even when the vaccine doesnt exactly match these viruses, it may still provide some protection. Influenza vaccine does not cause flu. Influenza vaccine may be given at the same time as other vaccines. 3. Talk with your health care provider    Tell your vaccination provider if the person getting the vaccine:   Has had an allergic reaction after a previous dose of influenza vaccine, or has any severe, life-threatening allergies    Has ever had Guillain-Barré Syndrome (also called GBS)    In some cases, your health care provider may decide to postpone influenza vaccination until a future visit. Influenza vaccine can be administered at any time during pregnancy. People who are or will be pregnant during influenza season should receive inactivated influenza vaccine. People with minor illnesses, such as a cold, may be vaccinated. People who are moderately or severely ill should usually wait until they recover before getting influenza vaccine. Your health care provider can give you more information. 4. Risks of a vaccine reaction     Soreness, redness, and swelling where the shot is given, fever, muscle aches, and headache can happen after influenza vaccination.  There may be a very small increased risk of Guillain-Barré Syndrome (GBS) after inactivated influenza vaccine (the flu shot). Leoma March children who get the flu shot along with pneumococcal vaccine (PCV13) and/or DTaP vaccine at the same time might be slightly more likely to have a seizure caused by fever. Tell your health care provider if a child who is getting flu vaccine has ever had a seizure. People sometimes faint after medical procedures, including vaccination. Tell your provider if you feel dizzy or have vision changes or ringing in the ears. As with any medicine, there is a very remote chance of a vaccine causing a severe allergic reaction, other serious injury, or death. 5. What if there is a serious problem?     An allergic reaction could occur after the vaccinated person leaves the clinic. If you see signs of a severe allergic reaction (hives, swelling of the face and throat, difficulty breathing, a fast heartbeat, dizziness, or weakness), call 9-1-1 and get the person to the nearest hospital.    For other signs that concern you, call your health care provider. Adverse reactions should be reported to the Vaccine Adverse Event Reporting System (VAERS). Your health care provider will usually file this report, or you can do it yourself. Visit the VAERS website at www.vaers. [facility].gov or call 5-536.486.8108. VAERS is only for reporting reactions, and VAERS staff members do not give medical advice. 6. The National Vaccine Injury Compensation Program    The [facility] Vaccine Injury Compensation Program (VICP) is a federal program that was created to compensate people who may have been injured by certain vaccines. Claims regarding alleged injury or death due to vaccination have a time limit for filing, which may be as short as two years. Visit the VICP website at www.[facility].gov/vaccinecompensation or call 7-284.392.7190 to learn about the program and about filing a claim. 7. How can I learn more?  Ask your health care provider.  Call your local or state health department.  Visit the website of the Food and Drug Administration (FDA) for vaccine package inserts and additional information at www.fda.gov/vaccines-blood-biologics/vaccines.  Contact the Centers for Disease Control and Prevention (CDC):  - Call 8-924.679.7375 (1-800-CDC-INFO) or  - Visit CDCs influenza website at www.cdc.gov/flu. Vaccine Information Statement   Inactivated Influenza Vaccine   8/6/2021  [address] 517NS-25   Department of Health and Human Services  Centers for Disease Control and Prevention    Office Use Only

## 2022-01-10 ENCOUNTER — OFFICE VISIT (OUTPATIENT)
Dept: FAMILY MEDICINE CLINIC | Age: 42
End: 2022-01-10
Payer: COMMERCIAL

## 2022-01-10 VITALS
OXYGEN SATURATION: 99 % | HEIGHT: 72 IN | HEART RATE: 76 BPM | SYSTOLIC BLOOD PRESSURE: 127 MMHG | BODY MASS INDEX: 37.98 KG/M2 | RESPIRATION RATE: 16 BRPM | WEIGHT: 280.4 LBS | DIASTOLIC BLOOD PRESSURE: 80 MMHG | TEMPERATURE: 97.8 F

## 2022-01-10 DIAGNOSIS — E55.9 VITAMIN D DEFICIENCY: ICD-10-CM

## 2022-01-10 DIAGNOSIS — E78.5 DYSLIPIDEMIA: ICD-10-CM

## 2022-01-10 DIAGNOSIS — I10 PRIMARY HYPERTENSION: Primary | ICD-10-CM

## 2022-01-10 DIAGNOSIS — Z79.899 ENCOUNTER FOR LONG-TERM (CURRENT) USE OF MEDICATIONS: ICD-10-CM

## 2022-01-10 PROCEDURE — 99202 OFFICE O/P NEW SF 15 MIN: CPT | Performed by: FAMILY MEDICINE

## 2022-01-10 RX ORDER — ERGOCALCIFEROL 1.25 MG/1
CAPSULE ORAL
Qty: 12 CAPSULE | Refills: 0 | Status: SHIPPED | OUTPATIENT
Start: 2022-01-10 | End: 2022-03-28

## 2022-01-10 RX ORDER — ROSUVASTATIN CALCIUM 5 MG/1
5 TABLET, COATED ORAL
Qty: 90 TABLET | Refills: 0 | Status: SHIPPED | OUTPATIENT
Start: 2022-01-10 | End: 2022-03-30 | Stop reason: SDUPTHER

## 2022-01-10 RX ORDER — LISINOPRIL 10 MG/1
10 TABLET ORAL DAILY
Qty: 90 TABLET | Refills: 0 | Status: SHIPPED | OUTPATIENT
Start: 2022-01-10 | End: 2022-03-30 | Stop reason: SDUPTHER

## 2022-01-10 NOTE — PROGRESS NOTES
Kylee Alvarado. is a 35 y.o. male, who's a new patient to our practice. Previous PCP:   Last seen there: 06/2021    Vaccinated for COVID  Hx of COVID    He works as a . has a past medical history of Hypercholesterolemia and Hypertension. Reviewed: active problem list, medication list, allergies, family history, social history, health maintenance, notes from last encounter, lab results    A comprehensive review of systems was negative except for that written in the HPI, on 14 ROS. Allergies   Allergen Reactions    Benadryl [Diphenhydramine Hcl] Anxiety     Severe panic attack     Current Outpatient Medications on File Prior to Visit   Medication Sig Dispense Refill    omega 3-DHA-EPA-fish oil (FISH OIL) 1,000 mg (120 mg-180 mg) capsule Take 1 Cap by mouth daily. No current facility-administered medications on file prior to visit. Patient Active Problem List   Diagnosis Code    Essential hypertension I10    Hypercholesterolemia E78.00    Obesity (BMI 30-39. 9) E66.9    Abnormal EKG R94.31    Severe obesity (HCC) E66.01       Visit Vitals  /80   Pulse 76   Temp 97.8 °F (36.6 °C) (Temporal)   Resp 16   Ht 6' (1.829 m)   Wt 280 lb 6.4 oz (127.2 kg)   SpO2 99%   BMI 38.03 kg/m²     General appearance: alert, cooperative, no distress, appears stated age  Neurologic: Alert and oriented X 3, normal strength and tone, symmetric. Normal without focal findings. Cranial nerves 2-12 intact. Normal coordination and gait. Mental status: Alert, oriented, thought content appropriate, affect: stable, mood-congruent. Head: Normocephalic, without obvious abnormality, atraumatic  Eyes: conjunctivae/corneas clear. PERRL, EOM's intact. Neck: supple, symmetrical, trachea midline, no JVD  Lungs: clear to auscultation bilaterally  Heart: regular rate and rhythm, S1, S2 normal, no murmur, click, rub or gallop  Abdomen: soft, non-tender.    Extremities: extremities normal, atraumatic, no cyanosis or edema      Assessment/Plans:    Diagnoses and all orders for this visit:    1. Primary hypertension  -     METABOLIC PANEL, BASIC; Future  -     HEPATIC FUNCTION PANEL; Future  -     lisinopriL (PRINIVIL, ZESTRIL) 10 mg tablet; Take 1 Tablet by mouth daily. 2. Dyslipidemia  -     METABOLIC PANEL, BASIC; Future  -     HEPATIC FUNCTION PANEL; Future  -     rosuvastatin (CRESTOR) 5 mg tablet; Take 1 Tablet by mouth nightly. 3. Vitamin D deficiency  -     ergocalciferol (ERGOCALCIFEROL) 1,250 mcg (50,000 unit) capsule; TAKE 1 CAP BY MOUTH EVERY 7 DAYS    4. Encounter for long-term (current) use of medications  -     METABOLIC PANEL, BASIC; Future  -     HEPATIC FUNCTION PANEL; Future      Discussed plans, risk/benefits of treatments/observations. Through the use of shared decision making, above plans were agreed upon. Medication compliance advised. Patient verbalized understanding. Follow-up and Dispositions    · Return in about 2 months (around 3/10/2022) for annual exam, HTN, HLD, meds, labs.          Jordon Abraham MD  1/10/2022

## 2022-03-18 PROBLEM — R94.31 ABNORMAL EKG: Status: ACTIVE | Noted: 2020-01-28

## 2022-03-19 PROBLEM — E78.00 HYPERCHOLESTEROLEMIA: Status: ACTIVE | Noted: 2019-07-09

## 2022-03-19 PROBLEM — I10 ESSENTIAL HYPERTENSION: Status: ACTIVE | Noted: 2019-07-09

## 2022-03-19 PROBLEM — E66.01 SEVERE OBESITY (HCC): Status: ACTIVE | Noted: 2020-06-26

## 2022-03-19 PROBLEM — E66.9 OBESITY (BMI 30-39.9): Status: ACTIVE | Noted: 2019-07-09

## 2022-03-25 DIAGNOSIS — E55.9 VITAMIN D DEFICIENCY: ICD-10-CM

## 2022-03-28 RX ORDER — ERGOCALCIFEROL 1.25 MG/1
CAPSULE ORAL
Qty: 12 CAPSULE | Refills: 0 | Status: SHIPPED | OUTPATIENT
Start: 2022-03-28 | End: 2022-03-30 | Stop reason: SDUPTHER

## 2022-03-30 ENCOUNTER — OFFICE VISIT (OUTPATIENT)
Dept: FAMILY MEDICINE CLINIC | Age: 42
End: 2022-03-30
Payer: COMMERCIAL

## 2022-03-30 VITALS
DIASTOLIC BLOOD PRESSURE: 78 MMHG | OXYGEN SATURATION: 97 % | RESPIRATION RATE: 16 BRPM | TEMPERATURE: 98.4 F | BODY MASS INDEX: 37.79 KG/M2 | WEIGHT: 279 LBS | SYSTOLIC BLOOD PRESSURE: 126 MMHG | HEART RATE: 75 BPM | HEIGHT: 72 IN

## 2022-03-30 DIAGNOSIS — E55.9 VITAMIN D DEFICIENCY: ICD-10-CM

## 2022-03-30 DIAGNOSIS — Z79.899 ENCOUNTER FOR LONG-TERM (CURRENT) USE OF MEDICATIONS: ICD-10-CM

## 2022-03-30 DIAGNOSIS — I10 PRIMARY HYPERTENSION: ICD-10-CM

## 2022-03-30 DIAGNOSIS — Z00.00 ANNUAL PHYSICAL EXAM: Primary | ICD-10-CM

## 2022-03-30 DIAGNOSIS — E66.01 SEVERE OBESITY (BMI 35.0-39.9) WITH COMORBIDITY (HCC): ICD-10-CM

## 2022-03-30 DIAGNOSIS — E78.5 DYSLIPIDEMIA: ICD-10-CM

## 2022-03-30 DIAGNOSIS — Z13.1 SCREENING FOR DIABETES MELLITUS: ICD-10-CM

## 2022-03-30 DIAGNOSIS — Z11.59 NEED FOR HEPATITIS C SCREENING TEST: ICD-10-CM

## 2022-03-30 PROCEDURE — 99396 PREV VISIT EST AGE 40-64: CPT | Performed by: FAMILY MEDICINE

## 2022-03-30 RX ORDER — ERGOCALCIFEROL 1.25 MG/1
CAPSULE ORAL
Qty: 4 CAPSULE | Refills: 5 | Status: SHIPPED | OUTPATIENT
Start: 2022-03-30 | End: 2022-11-01 | Stop reason: SDUPTHER

## 2022-03-30 RX ORDER — ROSUVASTATIN CALCIUM 5 MG/1
5 TABLET, COATED ORAL
Qty: 30 TABLET | Refills: 5 | Status: SHIPPED | OUTPATIENT
Start: 2022-03-30 | End: 2022-11-01 | Stop reason: SDUPTHER

## 2022-03-30 RX ORDER — LISINOPRIL 10 MG/1
10 TABLET ORAL DAILY
Qty: 30 TABLET | Refills: 5 | Status: SHIPPED | OUTPATIENT
Start: 2022-03-30 | End: 2022-11-01 | Stop reason: SDUPTHER

## 2022-03-30 NOTE — PROGRESS NOTES
Jodi Gregorio is a 38 y.o. male, annual exam today    2nd visit with this physician,     Vaccinated for COVID  Hx of Jennie Castleman    He works as a . has a past medical history of Hypercholesterolemia and Hypertension. HTN: BP at goal continue lisinopril    HLD: Crestor    Denies LUTS    Denies CP or SOB. Never had a colonoscopy      Reviewed: active problem list, medication list, allergies, family history, social history, health maintenance, notes from last encounter, lab results    A comprehensive review of systems was negative except for that written in the HPI, on 14 ROS. Allergies   Allergen Reactions    Benadryl [Diphenhydramine Hcl] Anxiety     Severe panic attack     Current Outpatient Medications on File Prior to Visit   Medication Sig Dispense Refill    elderberry fruit (ELDERBERRY PO) Take  by mouth.  omega 3-DHA-EPA-fish oil (FISH OIL) 1,000 mg (120 mg-180 mg) capsule Take 1 Cap by mouth daily. No current facility-administered medications on file prior to visit. Patient Active Problem List   Diagnosis Code    Essential hypertension I10    Hypercholesterolemia E78.00    Obesity (BMI 30-39. 9) E66.9    Abnormal EKG R94.31    Severe obesity (HCC) E66.01       Visit Vitals  /78   Pulse 75   Temp 98.4 °F (36.9 °C) (Temporal)   Resp 16   Ht 6' (1.829 m)   Wt 279 lb (126.6 kg)   SpO2 97%   BMI 37.84 kg/m²     General appearance: alert, cooperative, no distress, appears stated age  Neurologic: Alert and oriented X 3, normal strength and tone, symmetric. Normal without focal findings. Cranial nerves 2-12 intact. Normal coordination and gait. Mental status: Alert, oriented, thought content appropriate, affect: stable, mood-congruent. Head: Normocephalic, without obvious abnormality, atraumatic  Eyes: conjunctivae/corneas clear. PERRL, EOM's intact.    Neck: supple, symmetrical, trachea midline, no JVD  Lungs: clear to auscultation bilaterally  Heart: regular rate and rhythm, S1, S2 normal, no murmur, click, rub or gallop  Abdomen: soft, non-tender. : refuse exam  Extremities: extremities normal, atraumatic, no cyanosis or edema      Assessment/Plans:    Diagnoses and all orders for this visit:    1. Annual physical exam  -     CBC W/O DIFF; Future  -     METABOLIC PANEL, COMPREHENSIVE; Future  -     TSH 3RD GENERATION; Future  -     HEMOGLOBIN A1C WITH EAG; Future  -     LIPID PANEL; Future  -     HIV 1/2 AG/AB, 4TH GENERATION,W RFLX CONFIRM; Future  -     HEPATITIS C AB; Future  -     URINALYSIS W/MICROSCOPIC; Future    2. Dyslipidemia  -     METABOLIC PANEL, COMPREHENSIVE; Future  -     TSH 3RD GENERATION; Future  -     LIPID PANEL; Future  -     rosuvastatin (CRESTOR) 5 mg tablet; Take 1 Tablet by mouth nightly. 3. Primary hypertension  -     METABOLIC PANEL, COMPREHENSIVE; Future  -     TSH 3RD GENERATION; Future  -     lisinopriL (PRINIVIL, ZESTRIL) 10 mg tablet; Take 1 Tablet by mouth daily. 4. Severe obesity (BMI 35.0-39. 9) with comorbidity ([facility] [address]    5. Vitamin D deficiency  -     ergocalciferol (ERGOCALCIFEROL) 1,250 mcg (50,000 unit) capsule; TAKE 1 CAPSULE BY MOUTH EVERY 7 DAYS    6. Encounter for long-term (current) use of medications  -     CBC W/O DIFF; Future  -     METABOLIC PANEL, COMPREHENSIVE; Future  -     TSH 3RD GENERATION; Future  -     HEMOGLOBIN A1C WITH EAG; Future  -     LIPID PANEL; Future  -     HIV 1/2 AG/AB, 4TH GENERATION,W RFLX CONFIRM; Future  -     HEPATITIS C AB; Future  -     URINALYSIS W/MICROSCOPIC; Future    7. Screening for diabetes mellitus  -     HEMOGLOBIN A1C WITH EAG; Future    8. Need for hepatitis C screening test  -     HEPATITIS C AB; Future      Discussed plans, risk/benefits of treatments/observations. Through the use of shared decision making, above plans were agreed upon. Medication compliance advised. Patient verbalized understanding.      Follow-up and Dispositions · Return in about 6 months (around 9/30/2022) for HTN, hLD, meds, labs.   Follow-up and Disposition History         Jennie Huntley MD  3/30/2022

## 2022-03-30 NOTE — PROGRESS NOTES
Chief Complaint   Patient presents with    Hypertension    Cholesterol Problem     Check meds      1. Have you been to the ER, urgent care clinic since your last visit? Hospitalized since your last visit? No     2. Have you seen or consulted any other health care providers outside of the [address] [place] [address] since your last visit? Include any pap smears or colon screening.  No

## 2022-10-11 ENCOUNTER — OFFICE VISIT (OUTPATIENT)
Dept: FAMILY MEDICINE CLINIC | Age: 42
End: 2022-10-11
Payer: COMMERCIAL

## 2022-10-11 VITALS
SYSTOLIC BLOOD PRESSURE: 132 MMHG | DIASTOLIC BLOOD PRESSURE: 67 MMHG | TEMPERATURE: 98.2 F | BODY MASS INDEX: 37.11 KG/M2 | HEART RATE: 74 BPM | RESPIRATION RATE: 18 BRPM | OXYGEN SATURATION: 98 % | HEIGHT: 72 IN | WEIGHT: 274 LBS

## 2022-10-11 DIAGNOSIS — Z00.00 WELLNESS EXAMINATION: Primary | ICD-10-CM

## 2022-10-11 PROCEDURE — 99396 PREV VISIT EST AGE 40-64: CPT | Performed by: FAMILY MEDICINE

## 2022-10-11 NOTE — PROGRESS NOTES
Jay Casillas is a 38 y.o. male presenting for his Wellness checkup for his insurance. He report no need for labs. Just the vitals. I can import labs from 03/30/2022 over. It has to say wellness exam which it does. He will see me for his 6 months f/up separately. has a past medical history of Hypercholesterolemia and Hypertension. Results for orders placed or performed in visit on 03/30/22   URINALYSIS W/MICROSCOPIC   Result Value Ref Range    Color YELLOW/STRAW      Appearance CLEAR CLEAR      Specific gravity 1.010 1.003 - 1.030      pH (UA) 6.5 5.0 - 8.0      Protein Negative Negative mg/dL    Glucose Negative Negative mg/dL    Ketone Negative Negative mg/dL    Bilirubin Negative Negative      Blood Negative Negative      Urobilinogen 0.2 0.2 - 1.0 EU/dL    Nitrites Negative Negative      Leukocyte Esterase Negative Negative      WBC 0-4 0 - 4 /hpf    RBC 0-5 0 - 5 /hpf    Epithelial cells FEW FEW /lpf    Bacteria Negative Negative /hpf    Hyaline cast 0-2 0 - 5 /lpf   HEPATITIS C AB   Result Value Ref Range    Hep C virus Ab Interp.  NONREACTIVE NONREACTIVE     HIV 1/2 AG/AB, 4TH GENERATION,W RFLX CONFIRM   Result Value Ref Range    HIV 1/2 Interpretation NONREACTIVE NONREACTIVE      HIV 1/2 result comment SEE NOTE     LIPID PANEL   Result Value Ref Range    Cholesterol, total 179 <200 MG/DL    Triglyceride 90 <150 MG/DL    HDL Cholesterol 42 MG/DL    LDL, calculated 119 (H) 0 - 100 MG/DL    VLDL, calculated 18 MG/DL    CHOL/HDL Ratio 4.3 0.0 - 5.0     HEMOGLOBIN A1C WITH EAG   Result Value Ref Range    Hemoglobin A1c 6.2 (H) 4.0 - 5.6 %    Est. average glucose 131 mg/dL   TSH 3RD GENERATION   Result Value Ref Range    TSH 0.85 0.36 - 7.22 uIU/mL   METABOLIC PANEL, COMPREHENSIVE   Result Value Ref Range    Sodium 136 136 - 145 mmol/L    Potassium 4.2 3.5 - 5.1 mmol/L    Chloride 104 97 - 108 mmol/L    CO2 29 21 - 32 mmol/L    Anion gap 3 (L) 5 - 15 mmol/L    Glucose 98 65 - 100 mg/dL    BUN 17 6 - 20 MG/DL    Creatinine 1.16 0.70 - 1.30 MG/DL    BUN/Creatinine ratio 15 12 - 20      GFR est AA >60 >60 ml/min/1.73m2    GFR est non-AA >60 >60 ml/min/1.73m2    Calcium 9.0 8.5 - 10.1 MG/DL    Bilirubin, total 0.5 0.2 - 1.0 MG/DL    ALT (SGPT) 33 12 - 78 U/L    AST (SGOT) 20 15 - 37 U/L    Alk. phosphatase 52 45 - 117 U/L    Protein, total 7.3 6.4 - 8.2 g/dL    Albumin 3.8 3.5 - 5.0 g/dL    Globulin 3.5 2.0 - 4.0 g/dL    A-G Ratio 1.1 1.1 - 2.2     CBC W/O DIFF   Result Value Ref Range    WBC 6.7 4.1 - 11.1 K/uL    RBC 5.29 4. 10 - 5.70 M/uL    HGB 14.7 12.1 - 17.0 g/dL    HCT 48.3 36.6 - 50.3 %    MCV 91.3 80.0 - 99.0 FL    MCH 27.8 26.0 - 34.0 PG    MCHC 30.4 30.0 - 36.5 g/dL    RDW 14.2 11.5 - 14.5 %    PLATELET 248 718 - 602 K/uL    MPV 10.6 8.9 - 12.9 FL    NRBC 0.0 0  WBC    ABSOLUTE NRBC 0.00 0.00 - 0.01 K/uL       ROS:  Feeling well. No dyspnea or chest pain on exertion. No abdominal pain, change in bowel habits, black or bloody stools. No urinary tract or prostatic symptoms. No neurological complaints. Patient Active Problem List    Diagnosis Date Noted    Severe obesity ([facility] [address] 06/26/2020    Abnormal EKG 01/28/2020    Essential hypertension 07/09/2019    Hypercholesterolemia 07/09/2019    Obesity (BMI 30-39.9) 07/09/2019     Current Outpatient Medications   Medication Sig Dispense Refill    elderberry fruit (ELDERBERRY PO) Take  by mouth.      ergocalciferol (ERGOCALCIFEROL) 1,250 mcg (50,000 unit) capsule TAKE 1 CAPSULE BY MOUTH EVERY 7 DAYS 4 Capsule 5    rosuvastatin (CRESTOR) 5 mg tablet Take 1 Tablet by mouth nightly. 30 Tablet 5    lisinopriL (PRINIVIL, ZESTRIL) 10 mg tablet Take 1 Tablet by mouth daily. 30 Tablet 5    omega 3-DHA-EPA-fish oil 1,000 mg (120 mg-180 mg) capsule Take 1 Cap by mouth daily.        Allergies   Allergen Reactions    Benadryl [Diphenhydramine Hcl] Anxiety     Severe panic attack     Past Surgical History:   Procedure Laterality Date    HX ORTHOPAEDIC      Age [age]: Cut 5 tendons on glass R forearm-> nl now    HX WISDOM TEETH EXTRACTION  2005     Family History   Problem Relation Age of Onset    Diabetes Mother     Hypertension Mother     Elevated Lipids Father     COPD Father     Heart Disease Father     Kidney Disease Father     Cancer Father         throat    Hypertension Father     No Known Problems Sister     No Known Problems Brother     No Known Problems Sister     No Known Problems Brother      Social History     Tobacco Use    Smoking status: Former     Packs/day: 0.50     Years: 12.00     Pack years: 6.00     Types: Cigarettes    Smokeless tobacco: Never   Substance Use Topics    Alcohol use: Yes     Alcohol/week: 8.0 standard drinks     Types: 8 Cans of beer per week        Objective:     Visit Vitals  /67   Pulse 74   Temp 98.2 °F (36.8 °C) (Temporal)   Resp 18   Ht 6' (1.829 m)   Wt 274 lb (124.3 kg)   SpO2 98%   BMI 37.16 kg/m²     General:  Alert, cooperative, no distress, appears stated age. Head:  Normocephalic, without obvious abnormality, atraumatic. Eyes:  Conjunctivae/corneas clear. PERRL, EOMs intact. Neck: Supple, symmetrical, trachea midline, no carotid bruit and no JVD. Lungs:   Clear to auscultation bilaterally. Heart:  Regular rate and rhythm, S1, S2 normal, no murmur, click, rub or gallop. Abdomen:   Soft, non-tender. Genitalia:  no penile lesions or discharge, no testicular masses or tenderness, no hernias. Extremities: Extremities normal, atraumatic, no cyanosis or edema. Pulses: 2+ and symmetric all extremities. Neurologic: CNII-XII intact. Normal strength, sensation and reflexes throughout. Assessment/Plan:     Diagnoses and all orders for this visit:    1. Wellness examination    Follow-up and Dispositions    Return in about 3 weeks (around 11/1/2022) for HTN, HLD, prediabetes, meds, labs.      Luis May MD  10/11/2022

## 2022-10-11 NOTE — PROGRESS NOTES
Chief Complaint   Patient presents with    Annual Wellness Visit     This is for Insurance     Patient aware had annual physical exam in March 2022. He said that this is different. 1. \"Have you been to the ER, urgent care clinic since your last visit? Hospitalized since your last visit? \" No    2. \"Have you seen or consulted any other health care providers outside of the [address] [place] [address] since your last visit? \" No     3. For patients aged 39-70: Has the patient had a colonoscopy / FIT/ Cologuard? NA - based on age      If the patient is female:    4. For patients aged 41-77: Has the patient had a mammogram within the past 2 years? NA - based on age or sex      11. For patients aged 21-65: Has the patient had a pap smear?  NA - based on age or sex

## 2022-10-19 ENCOUNTER — OFFICE VISIT (OUTPATIENT)
Dept: URGENT CARE | Age: 42
End: 2022-10-19
Payer: COMMERCIAL

## 2022-10-19 VITALS
RESPIRATION RATE: 16 BRPM | TEMPERATURE: 98.8 F | DIASTOLIC BLOOD PRESSURE: 76 MMHG | SYSTOLIC BLOOD PRESSURE: 117 MMHG | OXYGEN SATURATION: 97 % | HEART RATE: 80 BPM

## 2022-10-19 DIAGNOSIS — M25.561 ACUTE PAIN OF RIGHT KNEE: Primary | ICD-10-CM

## 2022-10-19 PROCEDURE — 99213 OFFICE O/P EST LOW 20 MIN: CPT | Performed by: NURSE PRACTITIONER

## 2022-10-19 RX ORDER — MELOXICAM 7.5 MG/1
7.5 TABLET ORAL DAILY
Qty: 10 TABLET | Refills: 0 | Status: SHIPPED | OUTPATIENT
Start: 2022-10-19 | End: 2022-11-03 | Stop reason: ALTCHOICE

## 2022-10-19 NOTE — PROGRESS NOTES
Leg Pain  Presents with Right knee pain and swelling for 7 days with gradual worsening. Pain is worse with standing. Denies any injury. History of left knee pain few years ago. Tried ibuprofen 800mg without significant relief. Past Medical History:   Diagnosis Date    Hypercholesterolemia     Hypertension         Past Surgical History:   Procedure Laterality Date    HX ORTHOPAEDIC      Age [age]: Cut 5 tendons on glass R forearm-> nl now    HX WISDOM TEETH EXTRACTION  2005         Family History   Problem Relation Age of Onset    Diabetes Mother     Hypertension Mother     Elevated Lipids Father     COPD Father     Heart Disease Father     Kidney Disease Father     Cancer Father         throat    Hypertension Father     No Known Problems Sister     No Known Problems Brother     No Known Problems Sister     No Known Problems Brother         Social History     Socioeconomic History    Marital status: UNKNOWN     Spouse name: Not on file    Number of children: Not on file    Years of education: Not on file    Highest education level: Not on file   Occupational History    Not on file   Tobacco Use    Smoking status: Former     Packs/day: 0.50     Years: 12.00     Pack years: 6.00     Types: Cigarettes    Smokeless tobacco: Never   Vaping Use    Vaping Use: Never used   Substance and Sexual Activity    Alcohol use:  Yes     Alcohol/week: 8.0 standard drinks     Types: 8 Cans of beer per week    Drug use: Never    Sexual activity: Yes     Partners: Female   Other Topics Concern    Not on file   Social History Narrative    Not on file     Social Determinants of Health     Financial Resource Strain: Low Risk     Difficulty of Paying Living Expenses: Not hard at all   Food Insecurity: No Food Insecurity    Worried About Running Out of Food in the Last Year: Never true    Ran Out of Food in the Last Year: Never true   Transportation Needs: Not on file   Physical Activity: Not on file   Stress: Not on file   Social Connections: Not on file   Intimate Partner Violence: Not on file   Housing Stability: Not on file                ALLERGIES: Benadryl [diphenhydramine hcl]    Review of Systems   Musculoskeletal:  Positive for arthralgias and joint swelling. All other systems reviewed and are negative. Vitals:    10/19/22 1819 10/19/22 1820   BP: (!) 146/88 117/76   Pulse: 80    Resp: 16    Temp: 98.8 °F (37.1 °C)    SpO2: 97%        Physical Exam  Constitutional:       General: He is not in acute distress. Appearance: Normal appearance. He is not ill-appearing or toxic-appearing. HENT:      Head: Normocephalic and atraumatic. Musculoskeletal:      Right upper leg: Normal.      Right knee: Swelling present. No deformity, erythema, bony tenderness or crepitus. Normal range of motion. Tenderness present. No LCL laxity, MCL laxity, ACL laxity or PCL laxity. Normal alignment. Normal pulse. Left knee: Normal.      Right ankle: Normal.      Left ankle: Normal.      Right foot: Normal.      Left foot: Normal.        Legs:    Neurological:      Mental Status: He is alert. ICD-10-CM ICD-9-CM   1. Acute pain of right knee  M25.561 719.46       Orders Placed This Encounter    XR KNEE RT 3 V     Standing Status:   Future     Number of Occurrences:   1     Standing Expiration Date:   10/20/2023    [facility] EMPL     Referral Priority:   Routine     Referral Type:   Consultation     Referral Reason:   Specialty Services Required     Referred to Provider:   Moe Flores DO     Number of Visits Requested:   1    meloxicam (MOBIC) 7.5 mg tablet     Sig: Take 1 Tablet by mouth daily. Dispense:  10 Tablet     Refill:  0      Rest, ice, elevate. Use compression sleeve to knee. The patient is to follow up with Orthopedics if no improvement. If signs and symptoms become worse the pt is to go to the ER.      Ирина Barbosa NP       MDM    Procedures

## 2022-10-19 NOTE — LETTER
NOTIFICATION RETURN TO WORK / SCHOOL    10/19/2022 7:11 PM    Mr. Sergei Hwang.  [address] [id number]      To Whom It May Concern:    Sergei Oliva is currently under the care of [address] [facility]. He will return to work/school on 10/21/2022. If there are questions or concerns please have the patient contact our office.         Sincerely,      GHE PROVIDER

## 2022-11-01 ENCOUNTER — OFFICE VISIT (OUTPATIENT)
Dept: FAMILY MEDICINE CLINIC | Age: 42
End: 2022-11-01
Payer: COMMERCIAL

## 2022-11-01 ENCOUNTER — HOSPITAL ENCOUNTER (OUTPATIENT)
Dept: GENERAL RADIOLOGY | Age: 42
Discharge: HOME | End: 2022-11-01
Payer: COMMERCIAL

## 2022-11-01 VITALS
SYSTOLIC BLOOD PRESSURE: 139 MMHG | HEART RATE: 68 BPM | DIASTOLIC BLOOD PRESSURE: 78 MMHG | RESPIRATION RATE: 18 BRPM | TEMPERATURE: 99.5 F | OXYGEN SATURATION: 98 % | HEIGHT: 72 IN | BODY MASS INDEX: 36.44 KG/M2 | WEIGHT: 269 LBS

## 2022-11-01 DIAGNOSIS — E78.5 DYSLIPIDEMIA: ICD-10-CM

## 2022-11-01 DIAGNOSIS — M25.512 ACUTE PAIN OF LEFT SHOULDER: ICD-10-CM

## 2022-11-01 DIAGNOSIS — Z02.89 ENCOUNTER TO OBTAIN EXCUSE FROM WORK: ICD-10-CM

## 2022-11-01 DIAGNOSIS — I10 PRIMARY HYPERTENSION: ICD-10-CM

## 2022-11-01 DIAGNOSIS — M79.661 RIGHT CALF PAIN: ICD-10-CM

## 2022-11-01 DIAGNOSIS — M25.561 ACUTE PAIN OF RIGHT KNEE: ICD-10-CM

## 2022-11-01 DIAGNOSIS — R73.03 PREDIABETES: ICD-10-CM

## 2022-11-01 DIAGNOSIS — E55.9 VITAMIN D DEFICIENCY: ICD-10-CM

## 2022-11-01 DIAGNOSIS — M75.02 ADHESIVE CAPSULITIS OF LEFT SHOULDER: Primary | ICD-10-CM

## 2022-11-01 DIAGNOSIS — Z79.899 ENCOUNTER FOR LONG-TERM (CURRENT) USE OF MEDICATIONS: ICD-10-CM

## 2022-11-01 PROCEDURE — 99214 OFFICE O/P EST MOD 30 MIN: CPT | Performed by: FAMILY MEDICINE

## 2022-11-01 PROCEDURE — 3074F SYST BP LT 130 MM HG: CPT | Performed by: FAMILY MEDICINE

## 2022-11-01 PROCEDURE — 3078F DIAST BP <80 MM HG: CPT | Performed by: FAMILY MEDICINE

## 2022-11-01 PROCEDURE — 73030 X-RAY EXAM OF SHOULDER: CPT

## 2022-11-01 RX ORDER — LISINOPRIL 10 MG/1
10 TABLET ORAL DAILY
Qty: 30 TABLET | Refills: 5 | Status: SHIPPED | OUTPATIENT
Start: 2022-11-01

## 2022-11-01 RX ORDER — ERGOCALCIFEROL 1.25 MG/1
CAPSULE ORAL
Qty: 4 CAPSULE | Refills: 5 | Status: SHIPPED | OUTPATIENT
Start: 2022-11-01

## 2022-11-01 RX ORDER — ROSUVASTATIN CALCIUM 5 MG/1
5 TABLET, COATED ORAL
Qty: 30 TABLET | Refills: 5 | Status: SHIPPED | OUTPATIENT
Start: 2022-11-01

## 2022-11-01 RX ORDER — PREDNISONE 10 MG/1
TABLET ORAL
Qty: 21 TABLET | Refills: 0 | Status: SHIPPED | OUTPATIENT
Start: 2022-11-01

## 2022-11-01 NOTE — PROGRESS NOTES
Tasha Obando. is a 38 y.o. male    Profession        has a past medical history of Hypercholesterolemia and Hypertension. Right knee pain; duration of 2-3 days rapid progress. Went to urgent care 11 days ago x-ray showed some swelling. Denies injuries or trauma. Just occurs on it's own. Was given meloxicam  Swelling improved, can't bend it back chaudhari. Like he used to  No peiod of immobility always moving. Non-smoker  Denies hx of blood clots    Left shoulder pain X 1 week. Denies injuries or trauma. Denies any injuries in the past w left shoulder. Decreased ROM    B/c of his work he's having a rough time. Went to work yesterday. He asked for time off work. We'll do letter for the rest of this week. HTN: BP at goal continue lisinopril     HLD: Crestor     Prediabetes A1C 6.2% 3/2022    Denies LUTS     Denies CP or SOB. Never had a colonoscopy      Reviewed: active problem list, medication list, allergies, notes from last encounter, lab results    A comprehensive review of systems was negative except for that written in the HPI. Allergies   Allergen Reactions    Benadryl [Diphenhydramine Hcl] Anxiety     Severe panic attack     Current Outpatient Medications on File Prior to Visit   Medication Sig Dispense Refill    elderberry fruit (ELDERBERRY PO) Take  by mouth. omega 3-DHA-EPA-fish oil 1,000 mg (120 mg-180 mg) capsule Take 1 Cap by mouth daily. meloxicam (MOBIC) 7.5 mg tablet Take 1 Tablet by mouth daily. (Patient not taking: Reported on 11/1/2022) 10 Tablet 0     No current facility-administered medications on file prior to visit. Patient Active Problem List   Diagnosis Code    Essential hypertension I10    Hypercholesterolemia E78.00    Obesity (BMI 30-39. 9) E66.9    Abnormal EKG R94.31    Severe obesity (HCC) E66.01       Visit Vitals  /78 (BP 1 Location: Left upper arm, BP Patient Position: Sitting, BP Cuff Size: Adult)   Pulse 68   Temp 99.5 °F (37.5 °C) (Temporal)   Resp 18   Ht 6' (1.829 m)   Wt 269 lb (122 kg)   SpO2 98%   BMI 36.48 kg/m²     General appearance: alert, cooperative, no distress, appears stated age  Neurologic: Alert and oriented X 3, normal strength and tone, symmetric. Normal without focal findings. Cranial nerves 2-12 intact. Normal coordination and gait. Mental status: Alert, oriented, thought content appropriate, affect: stable, mood-congruent. Head: Normocephalic, without obvious abnormality, atraumatic  Eyes: conjunctivae/corneas clear. PERRL, EOM's intact. Neck: supple, symmetrical, trachea midline, no JVD  Lungs: clear to auscultation bilaterally  Heart: regular rate and rhythm, S1, S2 normal, no murmur, click, rub or gallop  Abdomen: soft, non-tender. Extremities: knee right swelling. Calf looks normal, skin normal but tender posterior calf up to knee. Left shoulder ROM 90 degrees abduction.  deg flexion. Positive arminda and dana      Assessment/Plans:    Diagnoses and all orders for this visit:    1. Adhesive capsulitis of left shoulder  -     predniSONE (STERAPRED DS) 10 mg dose pack; See administration instruction per 10mg dose pack    2. Acute pain of left shoulder  -     XR SHOULDER LT AP/LAT MIN 2 V; Future    3. Right calf pain  -     DUPLEX LOWER EXT VENOUS RIGHT; Future    4. Acute pain of right knee  -     predniSONE (STERAPRED DS) 10 mg dose pack; See administration instruction per 10mg dose pack    5. Encounter to obtain excuse from work    6. Primary hypertension  -     lisinopriL (PRINIVIL, ZESTRIL) 10 mg tablet; Take 1 Tablet by mouth daily.  -     METABOLIC PANEL, COMPREHENSIVE; Future    7. Dyslipidemia  -     rosuvastatin (CRESTOR) 5 mg tablet; Take 1 Tablet by mouth nightly. -     METABOLIC PANEL, COMPREHENSIVE; Future    8. Vitamin D deficiency  -     ergocalciferol (ERGOCALCIFEROL) 1,250 mcg (50,000 unit) capsule; TAKE 1 CAPSULE BY MOUTH EVERY 7 DAYS    9.  Encounter for long-term (current) use of medications  -     METABOLIC PANEL, COMPREHENSIVE; Future  -     HEMOGLOBIN A1C WITH EAG; Future    10. Prediabetes  -     HEMOGLOBIN A1C WITH EAG; Future    Discussed plans, risk/benefits of treatments/observations. Through the use of shared decision making, above plans were agreed upon. Medication compliance advised. Patient verbalized understanding. Follow-up and Dispositions    Return in about 3 days (around 11/4/2022) for xrays, us, labs.          Adrienne Tony MD  11/1/2022

## 2022-11-01 NOTE — LETTER
NOTIFICATION RETURN TO WORK / SCHOOL    11/1/2022 9:32 AM    Mr. Juliana Yarbrough.  [address] [id number]      To Whom It May Concern:    Juliana Yarbrough. is currently under the care of Ritika Xiong. Please excuse Mr. Edward Pitch from 11/01/22 to 11/04/2022. He will return to work/school on: 11/05/2022    If there are questions or concerns please have the patient contact our office.         Sincerely,      Samantha Ambrocio MD

## 2022-11-01 NOTE — PROGRESS NOTES
Chief Complaint   Patient presents with    Follow-up     6 Month     1. \"Have you been to the ER, urgent care clinic since your last visit? Hospitalized since your last visit? \" No    2. \"Have you seen or consulted any other health care providers outside of the [address] [place] [address] since your last visit? \" No     3. For patients aged 39-70: Has the patient had a colonoscopy / FIT/ Cologuard? N/A      If the patient is female:    4. For patients aged 41-77: Has the patient had a mammogram within the past 2 years? N/A      5. For patients aged 21-65: Has the patient had a pap smear? N/A    Pt identified by 2 identifiers. Pt stated he is having Right knee and calf pain rated 7 out of 10. Left shoulder pain rated 8 out of 10. Mobic and extra strength Tylenol did not help.

## 2022-11-02 ENCOUNTER — HOSPITAL ENCOUNTER (OUTPATIENT)
Dept: VASCULAR SURGERY | Age: 42
Discharge: HOME | End: 2022-11-02
Attending: FAMILY MEDICINE
Payer: COMMERCIAL

## 2022-11-02 DIAGNOSIS — M79.661 RIGHT CALF PAIN: ICD-10-CM

## 2022-11-02 PROCEDURE — 93971 EXTREMITY STUDY: CPT

## 2022-11-03 ENCOUNTER — OFFICE VISIT (OUTPATIENT)
Dept: FAMILY MEDICINE CLINIC | Age: 42
End: 2022-11-03
Payer: COMMERCIAL

## 2022-11-03 VITALS
BODY MASS INDEX: 36.65 KG/M2 | HEIGHT: 72 IN | SYSTOLIC BLOOD PRESSURE: 136 MMHG | RESPIRATION RATE: 16 BRPM | HEART RATE: 85 BPM | TEMPERATURE: 99.1 F | DIASTOLIC BLOOD PRESSURE: 74 MMHG | OXYGEN SATURATION: 99 % | WEIGHT: 270.6 LBS

## 2022-11-03 DIAGNOSIS — M71.21 BAKER CYST, RIGHT: ICD-10-CM

## 2022-11-03 DIAGNOSIS — M25.561 ACUTE PAIN OF RIGHT KNEE: Primary | ICD-10-CM

## 2022-11-03 PROCEDURE — 3074F SYST BP LT 130 MM HG: CPT | Performed by: FAMILY MEDICINE

## 2022-11-03 PROCEDURE — 99214 OFFICE O/P EST MOD 30 MIN: CPT | Performed by: FAMILY MEDICINE

## 2022-11-03 PROCEDURE — 3078F DIAST BP <80 MM HG: CPT | Performed by: FAMILY MEDICINE

## 2022-11-03 RX ORDER — HYDROCODONE BITARTRATE AND ACETAMINOPHEN 5; 325 MG/1; MG/1
1 TABLET ORAL
Qty: 10 TABLET | Refills: 0 | Status: SHIPPED | OUTPATIENT
Start: 2022-11-03 | End: 2022-11-08

## 2022-11-03 NOTE — PROGRESS NOTES
Simran Mancera is a 38 y.o. male seen for      has a past medical history of Hypercholesterolemia and Hypertension. Started prednisone 2nd day. evaluation and treatment of right knee pain. This is evaluated as a personal injury. The pain began 2 weeks ago. Injury history: no, this just started on own. The pain is located posterior knee. Symptoms improve with rest. Symptoms worsen with deep knee bending. The knee has not given out or felt unstable. The patient cannot bend the knee fully. Treatment to date has included steroid pills, without significant relief. 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 ?baker cyst? Calf Muscle tears? Refer ortho  Hydrocodone      A comprehensive review of systems was negative except for that written in the HPI. Current Outpatient Medications   Medication Sig Dispense Refill    HYDROcodone-acetaminophen (NORCO) 5-325 mg per tablet Take 1 Tablet by mouth two (2) times daily as needed for Pain for up to 5 days. Max Daily Amount: 2 Tablets. 10 Tablet 0    ergocalciferol (ERGOCALCIFEROL) 1,250 mcg (50,000 unit) capsule TAKE 1 CAPSULE BY MOUTH EVERY 7 DAYS 4 Capsule 5    rosuvastatin (CRESTOR) 5 mg tablet Take 1 Tablet by mouth nightly. 30 Tablet 5    lisinopriL (PRINIVIL, ZESTRIL) 10 mg tablet Take 1 Tablet by mouth daily. 30 Tablet 5    predniSONE (STERAPRED DS) 10 mg dose pack See administration instruction per 10mg dose pack 21 Tablet 0    elderberry fruit (ELDERBERRY PO) Take  by mouth. omega 3-DHA-EPA-fish oil 1,000 mg (120 mg-180 mg) capsule Take 1 Cap by mouth daily.        Allergies   Allergen Reactions    Benadryl [Diphenhydramine Hcl] Anxiety     Severe panic attack     Past Medical History:   Diagnosis Date    Hypercholesterolemia     Hypertension      Past Surgical History:   Procedure Laterality Date    HX ORTHOPAEDIC      Age 13/12: Cut 5 tendons on glass R forearm-> nl now HX WISDOM TEETH EXTRACTION  2005        Objective:      Visit Vitals  /74   Pulse 85   Temp 99.1 °F (37.3 °C) (Temporal)   Resp 16   Ht 6' (1.829 m)   Wt 270 lb 9.6 oz (122.7 kg)   SpO2 99%   BMI 36.70 kg/m²       General appearance: alert, cooperative, no distress, appears stated age  Neurologic: Alert and oriented X 3, normal strength and tone, symmetric. Normal without focal findings. Cranial nerves 2-12 intact. Normal coordination and gait. Mental status: Alert, oriented, thought content appropriate, affect: stable, mood-congruent. Head: Normocephalic, without obvious abnormality, atraumatic  Eyes: conjunctivae/corneas clear. PERRL, EOM's intact. Neck: supple, symmetrical, trachea midline, no JVD  Lungs: clear to auscultation bilaterally  Heart: regular rate and rhythm, S1, S2 normal, no murmur, click, rub or gallop  Abdomen: soft, non-tender. Extremities: extremities normal, atraumatic, no cyanosis or edema    Gait: Antalgic. The patient can bear weight on the injured extremity. Bilateral Lower Extremity  Hip Palpation:  no tenderness over the greater  trochanter   Hip ROM: 100% of normal   Hamstring tightness reveals a popliteal angle to be 40 degrees   Knee Effusion:  0-1+   Ecchymosis:  none   Knee ROM:  180-90 degrees, pain posterior knee on bending   Patella:  Patella does track normally. Patellar apprehension test: negative  Patellar compression test: negative   Tenderness: quad tendon insertion and posterior knee   Stability:  Lachman's test: negative  Posterior drawer: negative  Medial collateral ligament: negative  Lateral collateral ligament: negative     Alexandra Test:  negative   Abundio's Test:  negative with no joint line tenderness   Sensation:   intact to light touch   Pulses: normal DP and PT pulses     Imaging  Xrays: 4 views of the knee demonstrate no remarkable findings. US: No evidence of deep vein thrombosis in the right lower extremity.    Incidental finding of non-vascular structure in the right posterior calf measuring 10.2 x 1.2 cm. Assessment:     Finish out prednisone    Diagnoses and all orders for this visit:    1. Acute pain of right knee  -     HYDROcodone-acetaminophen (NORCO) 5-325 mg per tablet; Take 1 Tablet by mouth two (2) times daily as needed for Pain for up to 5 days. Max Daily Amount: 2 Tablets.  -     REFERRAL TO ORTHOPEDICS    2. Lemos cyst, right  -     HYDROcodone-acetaminophen (NORCO) 5-325 mg per tablet; Take 1 Tablet by mouth two (2) times daily as needed for Pain for up to 5 days. Max Daily Amount: 2 Tablets.  -     REFERRAL TO ORTHOPEDICS    Follow-up and Dispositions    Return in about 6 months (around 5/3/2023) for f/up for chronic medical issues.          Yasmine Mcintyre MD  11/3/2022

## 2022-11-03 NOTE — PROGRESS NOTES
Chief Complaint   Patient presents with    Results       1. Have you been to the ER, urgent care clinic since your last visit? Hospitalized since your last visit? No    2. Have you seen or consulted any other health care providers outside of the [address] [place] [address] since your last visit? Include any pap smears or colon screening.  No

## 2022-11-04 ENCOUNTER — TELEPHONE (OUTPATIENT)
Dept: FAMILY MEDICINE CLINIC | Age: 42
End: 2022-11-04

## 2022-11-04 NOTE — TELEPHONE ENCOUNTER
----- Message from Wilner sent at 11/4/2022 12:21 PM EDT -----  Subject: Message to Provider    QUESTIONS  Information for Provider? Patient states he had labs drawn for tests   ordered by his PCP. He said re received a call from the lab tech today   that he needs another lab draw. Patient is requesting a call back from PCP   to be sure that all the labs are ordered for him to come back for the lab   draw.   ---------------------------------------------------------------------------  --------------  [id number] [employer]  [phone]; OK to leave message on voicemail  ---------------------------------------------------------------------------  --------------  SCRIPT ANSWERS  Relationship to Patient?  Self

## 2022-11-08 ENCOUNTER — OFFICE VISIT (OUTPATIENT)
Dept: ORTHOPEDIC SURGERY | Age: 42
End: 2022-11-08
Payer: COMMERCIAL

## 2022-11-08 VITALS — WEIGHT: 270 LBS | HEIGHT: 72 IN | BODY MASS INDEX: 36.57 KG/M2

## 2022-11-08 DIAGNOSIS — M25.461 EFFUSION OF RIGHT KNEE: Primary | ICD-10-CM

## 2022-11-08 PROCEDURE — 20610 DRAIN/INJ JOINT/BURSA W/O US: CPT | Performed by: PHYSICIAN ASSISTANT

## 2022-11-08 PROCEDURE — 99204 OFFICE O/P NEW MOD 45 MIN: CPT | Performed by: PHYSICIAN ASSISTANT

## 2022-11-08 RX ORDER — TRIAMCINOLONE ACETONIDE 40 MG/ML
40 INJECTION, SUSPENSION INTRA-ARTICULAR; INTRAMUSCULAR ONCE
Status: COMPLETED | OUTPATIENT
Start: 2022-11-08 | End: 2022-11-08

## 2022-11-08 RX ORDER — DICLOFENAC SODIUM 75 MG/1
75 TABLET, DELAYED RELEASE ORAL 2 TIMES DAILY WITH MEALS
Qty: 60 TABLET | Refills: 0 | Status: SHIPPED | OUTPATIENT
Start: 2022-11-08

## 2022-11-08 RX ADMIN — TRIAMCINOLONE ACETONIDE 40 MG: 40 INJECTION, SUSPENSION INTRA-ARTICULAR; INTRAMUSCULAR at 10:18

## 2022-11-08 NOTE — PROGRESS NOTES
Aparna Amado. (: 1980) is a 38 y.o. male patient, here for evaluation of the following chief complaint(s):  Knee Pain (Right knee pain/)       ASSESSMENT/PLAN:  Below is the assessment and plan developed based on review of pertinent history, physical exam, labs, studies, and medications. 75-year-old male comes in today for evaluation of right knee pain. States it started 2 to 3 weeks ago. Denies any overt mechanism of injury. Since then he has had swelling pain with walking. Pain is located in the anterior knee and extends to the posterior knee. He does have a moderate effusion today. He has been trying some at home exercises activity modification and steroid pack given to him by his PCP with no real improvement in symptoms. Discussed other treatment options with patient. Would like to move forward with an aspiration and injection. After verbal consent was obtained I aspirated 50 cc of clear yellow synovial fluid I then injected  3mL Lidocaine and 40 mg triamcinolone into the right knee joint using sterile technique. Patient tolerated well. Prescription also given for diclofenac 75 mg twice daily. Risks and benefits of medication discussed with patient. Patient verbalized understanding. Encourage patient to continue with at home physical therapy exercises activity modification and rest as needed. Plans to monitor symptoms and follow-up as needed. If no improvement would consider moving forward with an MRI for further evaluation given failure of conservative treatment. 1. Effusion of right knee  -     diclofenac EC (VOLTAREN) 75 mg EC tablet; Take 1 Tablet by mouth two (2) times daily (with meals). , Normal, Disp-60 Tablet, R-0  -     DRAIN/INJECT LARGE JOINT/BURSA  -     triamcinolone acetonide (KENALOG-40) 40 mg/mL injection 40 mg; 40 mg, Intra artICUlar, ONCE, 1 dose, On 22 at 1100      Encounter Diagnosis   Name Primary?     Effusion of right knee Yes        No follow-ups on file. SUBJECTIVE/OBJECTIVE:  Juliana Urrutia (: 1980) is a 38 y.o. male who presents today for the following:  Chief Complaint   Patient presents with    Knee Pain     Right knee pain         43-year-old male comes in today for evaluation of right knee pain. States it started 2 to 3 weeks ago. Denies any overt mechanism of injury. Since then he has had swelling pain with walking. Pain is located in the anterior knee and extends to the posterior knee. He does have a moderate effusion today. He has been trying some at home exercises activity modification and steroid pack given to him by his PCP with no real improvement in symptoms. IMAGING:  XR Results (most recent):  Results from Hospital Encounter encounter on 22    XR SHOULDER LT AP/LAT MIN 2 V    Narrative  EXAM: XR SHOULDER LT AP/LAT MIN 2 V    INDICATION: .  Pain    COMPARISON: None. FINDINGS: Three views of the left shoulder demonstrate no fracture, dislocation  or other acute abnormality. Mild DJD AC joint and chronic deformity tuberosity    Impression  No acute abnormality. Allergies   Allergen Reactions    Benadryl [Diphenhydramine Hcl] Anxiety     Severe panic attack       Current Outpatient Medications   Medication Sig    diclofenac EC (VOLTAREN) 75 mg EC tablet Take 1 Tablet by mouth two (2) times daily (with meals). HYDROcodone-acetaminophen (NORCO) 5-325 mg per tablet Take 1 Tablet by mouth two (2) times daily as needed for Pain for up to 5 days. Max Daily Amount: 2 Tablets.    ergocalciferol (ERGOCALCIFEROL) 1,250 mcg (50,000 unit) capsule TAKE 1 CAPSULE BY MOUTH EVERY 7 DAYS    rosuvastatin (CRESTOR) 5 mg tablet Take 1 Tablet by mouth nightly. lisinopriL (PRINIVIL, ZESTRIL) 10 mg tablet Take 1 Tablet by mouth daily. predniSONE (STERAPRED DS) 10 mg dose pack See administration instruction per 10mg dose pack    elderberry fruit (ELDERBERRY PO) Take  by mouth.     omega 3-DHA-EPA-fish oil 1,000 mg (120 mg-180 mg) capsule Take 1 Cap by mouth daily. Current Facility-Administered Medications   Medication    triamcinolone acetonide (KENALOG-40) 40 mg/mL injection 40 mg       Past Medical History:   Diagnosis Date    Hypercholesterolemia     Hypertension         Past Surgical History:   Procedure Laterality Date    HX ORTHOPAEDIC      Age 15/16: Cut 5 tendons on glass R forearm-> nl now    HX WISDOM TEETH EXTRACTION  2005       Family History   Problem Relation Age of Onset    Diabetes Mother     Hypertension Mother     Elevated Lipids Father     COPD Father     Heart Disease Father     Kidney Disease Father     Cancer Father         throat    Hypertension Father     No Known Problems Sister     No Known Problems Brother     No Known Problems Sister     No Known Problems Brother         Social History     Tobacco Use    Smoking status: Former     Packs/day: 0.50     Years: 12.00     Pack years: 6.00     Types: Cigarettes    Smokeless tobacco: Never   Substance Use Topics    Alcohol use: Yes     Alcohol/week: 8.0 standard drinks     Types: 8 Cans of beer per week        All systems reviewed x 12 and were negative with the exception of None      No flowsheet data found. Vitals:  Ht 6' (1.829 m)   Wt 270 lb (122.5 kg)   BMI 36.62 kg/m²    Body mass index is 36.62 kg/m². Physical Exam    General: NAD, well developed, well nourished, alert and oriented x 3. Cardiac: Extremities well perfused    Respiratory: Nonlabored breathing    LLE: Normal gait and station. Negative stinchfield. No effusion noted. No previous incisions noted. ROM 0-120 degrees. Grossly stable to varus/valgus stress and anterior/posterior drawer tests. Negative McMurrays. Motor grossly intact. RLE: Mild antalgic gait. Moderate effusion noted. No previous incisions noted. ROM 0-120 degrees. Grossly stable to varus/valgus stress and anterior/posterior drawer tests. Equivocal McMurrays.   Anterior and posterior knee pain.  Motor grossly intact. Vascular: Palpable pedal pulses, equal bilaterally. Skin: Warm well perfused, cap refill < 2 sec. Akanksha Culp M.D. was available for immediate consultation as the supervising physician. An electronic signature was used to authenticate this note.   -- Juaquin Weller PA-C

## 2022-12-22 ENCOUNTER — HOSPITAL ENCOUNTER (EMERGENCY)
Age: 42
Discharge: HOME | End: 2022-12-22
Attending: STUDENT IN AN ORGANIZED HEALTH CARE EDUCATION/TRAINING PROGRAM
Payer: COMMERCIAL

## 2022-12-22 ENCOUNTER — APPOINTMENT (OUTPATIENT)
Dept: GENERAL RADIOLOGY | Age: 42
End: 2022-12-22
Attending: EMERGENCY MEDICINE
Payer: COMMERCIAL

## 2022-12-22 VITALS
SYSTOLIC BLOOD PRESSURE: 132 MMHG | TEMPERATURE: 97.9 F | OXYGEN SATURATION: 100 % | BODY MASS INDEX: 36.96 KG/M2 | RESPIRATION RATE: 16 BRPM | HEIGHT: 73 IN | HEART RATE: 64 BPM | WEIGHT: 278.88 LBS | DIASTOLIC BLOOD PRESSURE: 74 MMHG

## 2022-12-22 DIAGNOSIS — M54.10 RADICULOPATHY AFFECTING UPPER EXTREMITY: Primary | ICD-10-CM

## 2022-12-22 DIAGNOSIS — M25.512 ACUTE PAIN OF LEFT SHOULDER: ICD-10-CM

## 2022-12-22 LAB
ALBUMIN SERPL-MCNC: 3.7 G/DL (ref 3.5–5)
ALBUMIN/GLOB SERPL: 1.1 {RATIO} (ref 1.1–2.2)
ALP SERPL-CCNC: 48 U/L (ref 45–117)
ALT SERPL-CCNC: 34 U/L (ref 12–78)
ANION GAP SERPL CALC-SCNC: 5 MMOL/L (ref 5–15)
AST SERPL-CCNC: 23 U/L (ref 15–37)
BASOPHILS # BLD: 0.1 K/UL (ref 0–0.1)
BASOPHILS NFR BLD: 1 % (ref 0–1)
BILIRUB SERPL-MCNC: 0.4 MG/DL (ref 0.2–1)
BNP SERPL-MCNC: 47 PG/ML
BUN SERPL-MCNC: 16 MG/DL (ref 6–20)
BUN/CREAT SERPL: 15 (ref 12–20)
CALCIUM SERPL-MCNC: 8.9 MG/DL (ref 8.5–10.1)
CHLORIDE SERPL-SCNC: 101 MMOL/L (ref 97–108)
CO2 SERPL-SCNC: 32 MMOL/L (ref 21–32)
CREAT SERPL-MCNC: 1.09 MG/DL (ref 0.7–1.3)
DIFFERENTIAL METHOD BLD: NORMAL
EOSINOPHIL # BLD: 0.1 K/UL (ref 0–0.4)
EOSINOPHIL NFR BLD: 1 % (ref 0–7)
ERYTHROCYTE [DISTWIDTH] IN BLOOD BY AUTOMATED COUNT: 13.7 % (ref 11.5–14.5)
GLOBULIN SER CALC-MCNC: 3.4 G/DL (ref 2–4)
GLUCOSE SERPL-MCNC: 107 MG/DL (ref 65–100)
HCT VFR BLD AUTO: 44.8 % (ref 36.6–50.3)
HGB BLD-MCNC: 14.1 G/DL (ref 12.1–17)
IMM GRANULOCYTES # BLD AUTO: 0 K/UL (ref 0–0.04)
IMM GRANULOCYTES NFR BLD AUTO: 0 % (ref 0–0.5)
LYMPHOCYTES # BLD: 2.9 K/UL (ref 0.8–3.5)
LYMPHOCYTES NFR BLD: 37 % (ref 12–49)
MCH RBC QN AUTO: 27.6 PG (ref 26–34)
MCHC RBC AUTO-ENTMCNC: 31.5 G/DL (ref 30–36.5)
MCV RBC AUTO: 87.8 FL (ref 80–99)
MONOCYTES # BLD: 0.6 K/UL (ref 0–1)
MONOCYTES NFR BLD: 8 % (ref 5–13)
NEUTS SEG # BLD: 4.1 K/UL (ref 1.8–8)
NEUTS SEG NFR BLD: 53 % (ref 32–75)
NRBC # BLD: 0 K/UL (ref 0–0.01)
NRBC BLD-RTO: 0 PER 100 WBC
PLATELET # BLD AUTO: 277 K/UL (ref 150–400)
PMV BLD AUTO: 10 FL (ref 8.9–12.9)
POTASSIUM SERPL-SCNC: 4.2 MMOL/L (ref 3.5–5.1)
PROT SERPL-MCNC: 7.1 G/DL (ref 6.4–8.2)
RBC # BLD AUTO: 5.1 M/UL (ref 4.1–5.7)
SODIUM SERPL-SCNC: 138 MMOL/L (ref 136–145)
TROPONIN-HIGH SENSITIVITY: 7 NG/L (ref 0–76)
WBC # BLD AUTO: 7.8 K/UL (ref 4.1–11.1)

## 2022-12-22 PROCEDURE — 83880 ASSAY OF NATRIURETIC PEPTIDE: CPT

## 2022-12-22 PROCEDURE — 84484 ASSAY OF TROPONIN QUANT: CPT

## 2022-12-22 PROCEDURE — 74011636637 HC RX REV CODE- 636/637: Performed by: STUDENT IN AN ORGANIZED HEALTH CARE EDUCATION/TRAINING PROGRAM

## 2022-12-22 PROCEDURE — 74011250637 HC RX REV CODE- 250/637: Performed by: STUDENT IN AN ORGANIZED HEALTH CARE EDUCATION/TRAINING PROGRAM

## 2022-12-22 PROCEDURE — 93005 ELECTROCARDIOGRAM TRACING: CPT

## 2022-12-22 PROCEDURE — 71046 X-RAY EXAM CHEST 2 VIEWS: CPT

## 2022-12-22 PROCEDURE — 99285 EMERGENCY DEPT VISIT HI MDM: CPT

## 2022-12-22 PROCEDURE — 85025 COMPLETE CBC W/AUTO DIFF WBC: CPT

## 2022-12-22 PROCEDURE — 80053 COMPREHEN METABOLIC PANEL: CPT

## 2022-12-22 PROCEDURE — 36415 COLL VENOUS BLD VENIPUNCTURE: CPT

## 2022-12-22 RX ORDER — METHOCARBAMOL 750 MG/1
1500 TABLET, FILM COATED ORAL
Qty: 40 TABLET | Refills: 0 | Status: SHIPPED | OUTPATIENT
Start: 2022-12-22 | End: 2023-01-05

## 2022-12-22 RX ORDER — METHYLPREDNISOLONE 4 MG/1
TABLET ORAL
Qty: 1 DOSE PACK | Refills: 0 | Status: SHIPPED | OUTPATIENT
Start: 2022-12-22

## 2022-12-22 RX ORDER — PREDNISONE 20 MG/1
40 TABLET ORAL
Status: DISCONTINUED | OUTPATIENT
Start: 2022-12-23 | End: 2022-12-22

## 2022-12-22 RX ORDER — ACETAMINOPHEN 325 MG/1
975 TABLET ORAL
Status: COMPLETED | OUTPATIENT
Start: 2022-12-22 | End: 2022-12-22

## 2022-12-22 RX ORDER — PREDNISONE 20 MG/1
40 TABLET ORAL
Status: COMPLETED | OUTPATIENT
Start: 2022-12-22 | End: 2022-12-22

## 2022-12-22 RX ADMIN — ACETAMINOPHEN 975 MG: 325 TABLET ORAL at 17:03

## 2022-12-22 RX ADMIN — PREDNISONE 40 MG: 20 TABLET ORAL at 17:03

## 2022-12-22 NOTE — ED PROVIDER NOTES
EMERGENCY DEPARTMENT HISTORY AND PHYSICAL EXAM      Date: 12/22/2022  Patient Name: Jony Garcia. History of Presenting Illness     Chief Complaint   Patient presents with    Shoulder Pain     Constant pain in the left shoulder blade radiating down the left arm x 1 week. No n/v/sob or diaphoresis. Nothing makes the pain better or worse. No trauma or injury     History Provided By: Patient    HPI: Jony Garcia., 38 y.o. male with a past medical history significant for medical problems as stated below presents to the ED with cc of pain of his left scapula on palpation and has been persistent for the past 7 to 10 days. He had inciting events where he was at work and he was carrying a heavy object where the other person who is helping him carry was not lifting much of the weight. He reported that his pain started after this. And then a few days after his pain was going on he started having some shooting numbness starting from his shoulder and going down the inside of his left arm over the past week. He denies any weakness. No numbness tingling or weakness of any of his other extremities. He has not had any problems speaking or swallowing. He has been taking aleve in addition to his diclofenac that he takes for arthritis and has not had additional relief. He has had no difficulty with range of motion of his elft shoulder and has still been able to work. Denies any family history of strokes, but does note that his mother just had an open bypass surgery for her heart. He reports a history of high blood pressure, but no diabetes and does not smoke cigarettes. There are no associated symptoms. No other exacerbating or ameliorating factors. PCP: Cheri Caba MD    No current facility-administered medications on file prior to encounter.      Current Outpatient Medications on File Prior to Encounter   Medication Sig Dispense Refill    diclofenac EC (VOLTAREN) 75 mg EC tablet Take 1 Tablet by mouth two (2) times daily (with meals). 60 Tablet 0    ergocalciferol (ERGOCALCIFEROL) 1,250 mcg (50,000 unit) capsule TAKE 1 CAPSULE BY MOUTH EVERY 7 DAYS 4 Capsule 5    rosuvastatin (CRESTOR) 5 mg tablet Take 1 Tablet by mouth nightly. 30 Tablet 5    lisinopriL (PRINIVIL, ZESTRIL) 10 mg tablet Take 1 Tablet by mouth daily. 30 Tablet 5    predniSONE (STERAPRED DS) 10 mg dose pack See administration instruction per 10mg dose pack 21 Tablet 0    elderberry fruit (ELDERBERRY PO) Take  by mouth. omega 3-DHA-EPA-fish oil 1,000 mg (120 mg-180 mg) capsule Take 1 Cap by mouth daily. Past History     Past Medical History:  Past Medical History:   Diagnosis Date    Hypercholesterolemia     Hypertension        Past Surgical History:  Past Surgical History:   Procedure Laterality Date    HX ORTHOPAEDIC      Age [age]: Cut 5 tendons on glass R forearm-> nl now    HX WISDOM TEETH EXTRACTION  2005       Family History:  Family History   Problem Relation Age of Onset    Diabetes Mother     Hypertension Mother     Elevated Lipids Father     COPD Father     Heart Disease Father     Kidney Disease Father     Cancer Father         throat    Hypertension Father     No Known Problems Sister     No Known Problems Brother     No Known Problems Sister     No Known Problems Brother        Social History:  Social History     Tobacco Use    Smoking status: Former     Packs/day: 0.50     Years: 12.00     Pack years: 6.00     Types: Cigarettes    Smokeless tobacco: Never   Vaping Use    Vaping Use: Never used   Substance Use Topics    Alcohol use: Yes     Alcohol/week: 8.0 standard drinks     Types: 8 Cans of beer per week    Drug use: Never       Allergies: Allergies   Allergen Reactions    Benadryl [Diphenhydramine Hcl] Anxiety     Severe panic attack         Review of Systems   Review of Systems   Constitutional:  Negative for appetite change. HENT:  Negative for sore throat. Eyes:  Negative for visual disturbance. Respiratory:  Negative for cough and shortness of breath. Cardiovascular:  Negative for chest pain. Gastrointestinal:  Negative for abdominal pain, diarrhea, nausea and vomiting. Genitourinary:  Negative for difficulty urinating. Musculoskeletal:  Negative for myalgias, neck pain and neck stiffness. Left scapular pain   Skin:  Negative for color change. Neurological:  Negative for dizziness, weakness and headaches. Sensation change of inside of left arm   Psychiatric/Behavioral:  Negative for agitation. Physical Exam   Physical Exam  Constitutional:       Appearance: Normal appearance. HENT:      Head: Normocephalic and atraumatic. Mouth/Throat:      Mouth: Mucous membranes are moist.   Eyes:      General: No scleral icterus. Conjunctiva/sclera: Conjunctivae normal.      Pupils: Pupils are equal, round, and reactive to light. Neck:      Comments: No reproducibility of symptoms with compression of neck  Cardiovascular:      Rate and Rhythm: Normal rate and regular rhythm. Heart sounds: Normal heart sounds. Pulmonary:      Effort: Pulmonary effort is normal.      Breath sounds: Normal breath sounds. Abdominal:      General: Abdomen is flat. There is no distension. Palpations: Abdomen is soft. Tenderness: There is no guarding or rebound. Musculoskeletal:         General: No swelling. Normal range of motion. Cervical back: Normal range of motion. No rigidity. Comments: FROM of left shoulder  Tenderness to palpation of posterior left shoudler over middle of scapula  No rash or bruising  No appreciable swelling   Skin:     General: Skin is warm and dry. Capillary Refill: Capillary refill takes less than 2 seconds. Neurological:      General: No focal deficit present. Mental Status: He is alert and oriented to person, place, and time. Cranial Nerves: No cranial nerve deficit. Motor: No weakness.       Gait: Gait normal. Comments: Sensation intact but diminished over left medial forearm   Psychiatric:         Mood and Affect: Mood normal.       Diagnostic Study Results     Labs -     Recent Results (from the past 24 hour(s))   EKG, 12 LEAD, INITIAL    Collection Time: 12/22/22  3:13 PM   Result Value Ref Range    Ventricular Rate 72 BPM    Atrial Rate 72 BPM    P-R Interval 138 ms    QRS Duration 90 ms    Q-T Interval 404 ms    QTC Calculation (Bezet) 442 ms    Calculated P Axis 52 degrees    Calculated R Axis 48 degrees    Calculated T Axis 20 degrees    Diagnosis       Normal sinus rhythm  Possible Left atrial enlargement  No previous ECGs available     CBC WITH AUTOMATED DIFF    Collection Time: 12/22/22  3:19 PM   Result Value Ref Range    WBC 7.8 4.1 - 11.1 K/uL    RBC 5.10 4. 10 - 5.70 M/uL    HGB 14.1 12.1 - 17.0 g/dL    HCT 44.8 36.6 - 50.3 %    MCV 87.8 80.0 - 99.0 FL    MCH 27.6 26.0 - 34.0 PG    MCHC 31.5 30.0 - 36.5 g/dL    RDW 13.7 11.5 - 14.5 %    PLATELET 904 825 - 897 K/uL    MPV 10.0 8.9 - 12.9 FL    NRBC 0.0 0  WBC    ABSOLUTE NRBC 0.00 0.00 - 0.01 K/uL    NEUTROPHILS 53 32 - 75 %    LYMPHOCYTES 37 12 - 49 %    MONOCYTES 8 5 - 13 %    EOSINOPHILS 1 0 - 7 %    BASOPHILS 1 0 - 1 %    IMMATURE GRANULOCYTES 0 0.0 - 0.5 %    ABS. NEUTROPHILS 4.1 1.8 - 8.0 K/UL    ABS. LYMPHOCYTES 2.9 0.8 - 3.5 K/UL    ABS. MONOCYTES 0.6 0.0 - 1.0 K/UL    ABS. EOSINOPHILS 0.1 0.0 - 0.4 K/UL    ABS. BASOPHILS 0.1 0.0 - 0.1 K/UL    ABS. IMM.  GRANS. 0.0 0.00 - 0.04 K/UL    DF AUTOMATED     METABOLIC PANEL, COMPREHENSIVE    Collection Time: 12/22/22  3:19 PM   Result Value Ref Range    Sodium 138 136 - 145 mmol/L    Potassium 4.2 3.5 - 5.1 mmol/L    Chloride 101 97 - 108 mmol/L    CO2 32 21 - 32 mmol/L    Anion gap 5 5 - 15 mmol/L    Glucose 107 (H) 65 - 100 mg/dL    BUN 16 6 - 20 MG/DL    Creatinine 1.09 0.70 - 1.30 MG/DL    BUN/Creatinine ratio 15 12 - 20      eGFR >60 >60 ml/min/1.73m2    Calcium 8.9 8.5 - 10.1 MG/DL    Bilirubin, total 0.4 0.2 - 1.0 MG/DL    ALT (SGPT) 34 12 - 78 U/L    AST (SGOT) 23 15 - 37 U/L    Alk. phosphatase 48 45 - 117 U/L    Protein, total 7.1 6.4 - 8.2 g/dL    Albumin 3.7 3.5 - 5.0 g/dL    Globulin 3.4 2.0 - 4.0 g/dL    A-G Ratio 1.1 1.1 - 2.2     NT-PRO BNP    Collection Time: 12/22/22  3:19 PM   Result Value Ref Range    NT pro-BNP 47 <125 PG/ML   TROPONIN-HIGH SENSITIVITY    Collection Time: 12/22/22  3:19 PM   Result Value Ref Range    Troponin-High Sensitivity 7 0 - 76 ng/L       Radiologic Studies -   XR CHEST PA LAT   Final Result   No acute cardiopulmonary abnormality. CT Results  (Last 48 hours)      None          CXR Results  (Last 48 hours)                 12/22/22 1556  XR CHEST PA LAT Final result    Impression:  No acute cardiopulmonary abnormality. Narrative:  EXAM:  XR CHEST PA LAT       INDICATION:  Chest Pain       COMPARISON: None. TECHNIQUE: PA and lateral chest radiographs. FINDINGS: The lungs are clear. Heart size and mediastinal contours are normal.   No pleural effusion or pneumothorax. Bones are age-appropriate. Medical Decision Making   I am the first provider for this patient. I reviewed the vital signs, available nursing notes, past medical history, past surgical history, family history and social history. Vital Signs-Reviewed the patient's vital signs. Patient Vitals for the past 12 hrs:   Temp Pulse Resp BP SpO2   12/22/22 1505 97.9 °F (36.6 °C) 64 16 132/74 100 %       Records Reviewed: Nursing records and medical records reviewed    EKG as interpreted by me with sinus rhythm, heart rate 72, normal axis, normal intervals, no ST changes    Provider Notes (Medical Decision Making):   Patient presents with acute left shoulder pain with component of likely radiculopathy in C8/T1 distribution of left forearm. Suspect musculoskeletal injury of rotator cuff with compression/impingement of nerve causing radiculopathy.   Low suspicion for stroke with clear inciting event, no other numbness/tingling/weakness, young age, low amount of comorbidities. No chest pain or shortness of breath, non-ischemic EKG, and negative troponin so doubt ACS. No leukocytosis, no fever, and no swelling or skin changes - very low suspicion for any kind of infection. Will treat with PO prednisone and PO tylenol. Will avoid NSAIDs with patient already on a dose. Will dc with PO Medrol dosepak and PO muscle relaxants; will avoid muscle relaxants in the ED because he drove here. Discussed importance of followup with PCP, shoulder exercises, and customary return precautions if no improvement. ED Course:   Initial assessment performed. The patients presenting problems have been discussed, and they are in agreement with the care plan formulated and outlined with them. I have encouraged them to ask questions as they arise throughout their visit. Critical Care:  None    Disposition:  Home    DISCHARGE PLAN:  1. Discharge Medication List as of 12/22/2022  4:49 PM        START taking these medications    Details   methylPREDNISolone (Medrol, Madi,) 4 mg tablet Please follow instructions on packet, Normal, Disp-1 Dose Pack, R-0      methocarbamoL (ROBAXIN) 750 mg tablet Take 2 Tablets by mouth every eight (8) hours as needed for Muscle Spasm(s) for up to 14 days. , Normal, Disp-40 Tablet, R-0           CONTINUE these medications which have NOT CHANGED    Details   diclofenac EC (VOLTAREN) 75 mg EC tablet Take 1 Tablet by mouth two (2) times daily (with meals). , Normal, Disp-60 Tablet, R-0      ergocalciferol (ERGOCALCIFEROL) 1,250 mcg (50,000 unit) capsule TAKE 1 CAPSULE BY MOUTH EVERY 7 DAYS, Normal, Disp-4 Capsule, R-5DX Code Needed  . rosuvastatin (CRESTOR) 5 mg tablet Take 1 Tablet by mouth nightly., Normal, Disp-30 Tablet, R-5DX Code Needed  . lisinopriL (PRINIVIL, ZESTRIL) 10 mg tablet Take 1 Tablet by mouth daily. , Normal, Disp-30 Tablet, R-5 predniSONE (STERAPRED DS) 10 mg dose pack See administration instruction per 10mg dose pack, Normal, Disp-21 Tablet, R-0      elderberry fruit (ELDERBERRY PO) Take  by mouth., Historical Med      omega 3-DHA-EPA-fish oil 1,000 mg (120 mg-180 mg) capsule Take 1 Cap by mouth daily. , Historical Med           2. Follow-up Information       Follow up With Specialties Details Why Contact Info    Elena Jain MD [facility] Medicine Schedule an appointment as soon as possible for a visit   [address] [place] [address] [address]  [facility]  [phone]      [facility] EMERGENCY DEPT Emergency Medicine  As needed, If symptoms worsen [address] [place] [address]  [phone]          3. Return to ED if worse     Diagnosis     Clinical Impression:   1. Radiculopathy affecting upper extremity    2. Acute pain of left shoulder      Attestations:    Mercedes Ctoa MD    Please note that this dictation was completed with [employer], the computer voice recognition software. Quite often unanticipated grammatical, syntax, homophones, and other interpretive errors are inadvertently transcribed by the computer software. Please disregard these errors. Please excuse any errors that have escaped final proofreading. Thank you.

## 2022-12-23 LAB
ATRIAL RATE: 72 BPM
CALCULATED P AXIS, ECG09: 52 DEGREES
CALCULATED R AXIS, ECG10: 48 DEGREES
CALCULATED T AXIS, ECG11: 20 DEGREES
DIAGNOSIS, 93000: NORMAL
P-R INTERVAL, ECG05: 138 MS
Q-T INTERVAL, ECG07: 404 MS
QRS DURATION, ECG06: 90 MS
QTC CALCULATION (BEZET), ECG08: 442 MS
VENTRICULAR RATE, ECG03: 72 BPM

## 2022-12-27 ENCOUNTER — VIRTUAL VISIT (OUTPATIENT)
Dept: FAMILY MEDICINE CLINIC | Age: 42
End: 2022-12-27
Payer: COMMERCIAL

## 2022-12-27 ENCOUNTER — TELEPHONE (OUTPATIENT)
Dept: FAMILY MEDICINE CLINIC | Age: 42
End: 2022-12-27

## 2022-12-27 DIAGNOSIS — M54.12 CERVICAL RADICULOPATHY: Primary | ICD-10-CM

## 2022-12-27 PROCEDURE — 99213 OFFICE O/P EST LOW 20 MIN: CPT | Performed by: FAMILY MEDICINE

## 2022-12-27 NOTE — PROGRESS NOTES
Consent: Veronica Lanier, who was seen by synchronous (real-time) audio-video technology, and/or his healthcare decision maker, is aware that this patient-initiated, Telehealth encounter on 12/27/2022 is a billable service, with coverage as determined by his insurance carrier. He is aware that he may receive a bill and has provided verbal consent to proceed: Yes. Veronica Lanier is a 38 y.o. male       has a past medical history of Hypercholesterolemia and Hypertension. 12/22/2022 went to ED for left shoulder pain. Cardiac lab were normal  EKG no acute findings  CXR normal.   Was given prednisone and was diagnosed w cervical radiculopathy    Left shoulder arm. Today \"feels pretty good\". \"The shooting pain not there anymore. The steroid has minimized the pain. Lower part of left arm feels numb, feels like weaker\". We discussed cervical radiculopathy  We'll refer to spine orthopedic      Reviewed: active problem list, medication list, allergies, notes from last encounter, lab results    A comprehensive review of systems was negative except for that written in the HPI. Assessment & Plan:   Diagnoses and all orders for this visit:    1. Cervical radiculopathy  -     REFERRAL TO SPINE SURGERY    Follow-up and Dispositions    Return in about 3 months (around 3/27/2023) for chronic medical issues. 712  Subjective: Veronica Lanier is a 38 y.o. male who was seen for Hospital Follow Up (ER follow up shoulder pain)      Prior to Admission medications    Medication Sig Start Date End Date Taking? Authorizing Provider   methylPREDNISolone (Medrol, Madi,) 4 mg tablet Please follow instructions on packet 12/22/22  Yes Smith Johnosn MD   methocarbamoL (ROBAXIN) 750 mg tablet Take 2 Tablets by mouth every eight (8) hours as needed for Muscle Spasm(s) for up to 14 days.  12/22/22 1/5/23 Yes Smith Johnson MD   diclofenac EC (VOLTAREN) 75 mg EC tablet Take 1 Tablet by mouth two (2) times daily (with meals). 11/8/22  Yes Alejandra Muir PA-C   ergocalciferol (ERGOCALCIFEROL) 1,250 mcg (50,000 unit) capsule TAKE 1 CAPSULE BY MOUTH EVERY 7 DAYS 11/1/22  Yes Delfina Candelario MD   rosuvastatin (CRESTOR) 5 mg tablet Take 1 Tablet by mouth nightly. 11/1/22  Yes Fabi Lindquist MD   lisinopriL (PRINIVIL, ZESTRIL) 10 mg tablet Take 1 Tablet by mouth daily. 11/1/22  Yes Delfina Candelario MD   elderberry fruit (ELDERBERRY PO) Take  by mouth. Yes Provider, Historical   omega 3-DHA-EPA-fish oil 1,000 mg (120 mg-180 mg) capsule Take 1 Cap by mouth daily. Yes Provider, Historical     Allergies   Allergen Reactions    Benadryl [Diphenhydramine Hcl] Anxiety     Severe panic attack         Objective:   Vital Signs: (As obtained by patient/caregiver at home)  There were no vitals taken for this visit. Constitutional: [x] Appears well-developed and well-nourished [x] No apparent distress        Mental status: [x] Alert and awake  [x] Oriented to person/place/time [x] Able to follow commands      Eyes:   EOM    [x]  Normal      Sclera  [x]  Normal              Discharge [x]  None visible       HENT: [x] Normocephalic, atraumatic    [] Mouth/Throat: Mucous membranes are moist    External Ears [x] Normal      Neck: [x] No visualized mass     Pulmonary/Chest: [x] Respiratory effort normal   [x] No visualized signs of difficulty breathing or respiratory distress           Musculoskeletal:   [x] Normal gait with no signs of ataxia         [x] Normal range of motion of neck         Neurological:        [x] No Facial Asymmetry (Cranial nerve 7 motor function) (limited exam due to video visit)          [x] No gaze palsy              Skin:        [x] No significant exanthematous lesions or discoloration noted on facial skin                  Psychiatric:       [x] Normal Affect [] Abnormal -        [x] No Hallucinations      We discussed the expected course, resolution and complications of the diagnosis(es) in detail. Medication risks, benefits, costs, interactions, and alternatives were discussed as indicated. I advised him to contact the office if his condition worsens, changes or fails to improve as anticipated. He expressed understanding with the diagnosis(es) and plan. Sylvia Escobar. is a 38 y.o. male being evaluated by a video visit encounter for concerns as above. A caregiver was present when appropriate. Due to this being a TeleHealth encounter ([facility]), evaluation of the following organ systems was limited: Vitals/Constitutional/EENT/Resp/CV/GI//MS/Neuro/Skin/Heme-Lymph-Imm. Pursuant to the emergency declaration under the [facility], 1135 waiver authority and the [employer] and [employer] Act, this Virtual  Visit was conducted, with patient's (and/or legal guardian's) consent, to reduce the patient's risk of exposure to COVID-19 and provide necessary medical care. Services were provided through a video synchronous discussion virtually to substitute for in-person clinic visit. Patient and provider were located at their individual homes.         Jennie Huntley MD

## 2022-12-27 NOTE — TELEPHONE ENCOUNTER
----- Message from Alyssa Norris sent at 12/23/2022  4:04 PM EST -----  Subject: Message to Provider    QUESTIONS  Information for Provider? Patient is calling because he wants to be seen   sooner for an ED follow up for left shoulder pain that is radiating down   his arm. He mentioned that he would like an in-office appointment. Please   contact the patient to schedule.   ---------------------------------------------------------------------------  --------------  Tessa KEN  [phone]; OK to leave message on voicemail  ---------------------------------------------------------------------------  --------------  SCRIPT ANSWERS  Relationship to Patient?  Self

## 2022-12-27 NOTE — PROGRESS NOTES
Chief Complaint   Patient presents with    Hospital Follow Up     ER follow up shoulder pain       1. Have you been to the ER, urgent care clinic since your last visit? Hospitalized since your last visit? Yes ER    2. Have you seen or consulted any other health care providers outside of the [address] [place] [address] since your last visit? Include any pap smears or colon screening.  No

## 2023-01-12 ENCOUNTER — TRANSCRIBE ORDER (OUTPATIENT)
Dept: SCHEDULING | Age: 43
End: 2023-01-12

## 2023-01-12 DIAGNOSIS — M54.2 CERVICALGIA: ICD-10-CM

## 2023-01-12 DIAGNOSIS — M50.30 DEGENERATION OF CERVICAL INTERVERTEBRAL DISC: Primary | ICD-10-CM

## 2023-01-20 ENCOUNTER — HOSPITAL ENCOUNTER (OUTPATIENT)
Dept: MRI IMAGING | Age: 43
Discharge: HOME | End: 2023-01-20
Attending: ORTHOPAEDIC SURGERY
Payer: COMMERCIAL

## 2023-01-20 DIAGNOSIS — M54.2 CERVICALGIA: ICD-10-CM

## 2023-01-20 DIAGNOSIS — M50.30 DEGENERATION OF CERVICAL INTERVERTEBRAL DISC: ICD-10-CM

## 2023-01-20 PROCEDURE — 72141 MRI NECK SPINE W/O DYE: CPT

## 2023-03-11 ENCOUNTER — HOSPITAL ENCOUNTER (EMERGENCY)
Age: 43
Discharge: HOME | End: 2023-03-11
Attending: EMERGENCY MEDICINE
Payer: COMMERCIAL

## 2023-03-11 VITALS
HEIGHT: 72 IN | DIASTOLIC BLOOD PRESSURE: 92 MMHG | TEMPERATURE: 97.9 F | BODY MASS INDEX: 37 KG/M2 | SYSTOLIC BLOOD PRESSURE: 145 MMHG | RESPIRATION RATE: 20 BRPM | WEIGHT: 273.15 LBS | HEART RATE: 66 BPM | OXYGEN SATURATION: 100 %

## 2023-03-11 DIAGNOSIS — M25.461 PAIN AND SWELLING OF RIGHT KNEE: ICD-10-CM

## 2023-03-11 DIAGNOSIS — M70.51 BURSITIS OF RIGHT KNEE, UNSPECIFIED BURSA: Primary | ICD-10-CM

## 2023-03-11 DIAGNOSIS — M25.561 PAIN AND SWELLING OF RIGHT KNEE: ICD-10-CM

## 2023-03-11 DIAGNOSIS — I10 ACCELERATED HYPERTENSION: ICD-10-CM

## 2023-03-11 PROCEDURE — 74011250636 HC RX REV CODE- 250/636: Performed by: EMERGENCY MEDICINE

## 2023-03-11 PROCEDURE — 96372 THER/PROPH/DIAG INJ SC/IM: CPT

## 2023-03-11 PROCEDURE — 99284 EMERGENCY DEPT VISIT MOD MDM: CPT

## 2023-03-11 RX ORDER — METHYLPREDNISOLONE 4 MG/1
TABLET ORAL
Qty: 1 DOSE PACK | Refills: 0 | Status: SHIPPED | OUTPATIENT
Start: 2023-03-11

## 2023-03-11 RX ORDER — KETOROLAC TROMETHAMINE 30 MG/ML
30 INJECTION, SOLUTION INTRAMUSCULAR; INTRAVENOUS
Status: COMPLETED | OUTPATIENT
Start: 2023-03-11 | End: 2023-03-11

## 2023-03-11 RX ORDER — NAPROXEN 500 MG/1
500 TABLET ORAL 2 TIMES DAILY WITH MEALS
Qty: 20 TABLET | Refills: 0 | Status: SHIPPED | OUTPATIENT
Start: 2023-03-11

## 2023-03-11 RX ORDER — DEXAMETHASONE SODIUM PHOSPHATE 10 MG/ML
10 INJECTION INTRAMUSCULAR; INTRAVENOUS ONCE
Status: COMPLETED | OUTPATIENT
Start: 2023-03-11 | End: 2023-03-11

## 2023-03-11 RX ORDER — DICLOFENAC SODIUM 10 MG/G
GEL TOPICAL 4 TIMES DAILY
Qty: 20 G | Refills: 0 | Status: SHIPPED | OUTPATIENT
Start: 2023-03-11

## 2023-03-11 RX ADMIN — KETOROLAC TROMETHAMINE 30 MG: 30 INJECTION, SOLUTION INTRAMUSCULAR at 06:26

## 2023-03-11 RX ADMIN — DEXAMETHASONE SODIUM PHOSPHATE 10 MG: 10 INJECTION, SOLUTION INTRAMUSCULAR; INTRAVENOUS at 06:23

## 2023-03-11 NOTE — ED NOTES
This Patient phoned the ED at this time asking follow up questions about medication administered because it was not listed on their AVS. All questions answered.

## 2023-03-11 NOTE — DISCHARGE INSTRUCTIONS
Thank You! It was a pleasure taking care of you in our Emergency Department today. We know that when you come to [facility], you are entrusting us with your health, comfort, and safety. Our physicians and nurses honor that trust, and truly appreciate the opportunity to care for you and your loved ones. We also value your feedback. If you receive a survey about your Emergency Department experience today, please fill it out. We care about our patients' feedback, and we listen to what you have to say. Thank you. Dr. Rosa Moran M.D.      ________________________________________________________________________  I have included a copy of your lab results and/or radiologic studies from today's visit so you can have them easily available at your follow-up visit. We hope you feel better and please do not hesitate to contact the ED if you have any questions at all! No results found for this or any previous visit (from the past 12 hour(s)). No orders to display     CT Results  (Last 48 hours)      None          The exam and treatment you received in the Emergency Department were for an urgent problem and are not intended as complete care. It is important that you follow up with a doctor, nurse practitioner, or physician assistant for ongoing care. If your symptoms become worse or you do not improve as expected and you are unable to reach your usual health care provider, you should return to the Emergency Department. We are available 24 hours a day. Please take your discharge instructions with you when you go to your follow-up appointment. If a prescription has been provided, please have it filled as soon as possible to prevent a delay in treatment. Read the entire medication instruction sheet provided to you by the pharmacy.  If you have any questions or reservations about taking the medication due to side effects or interactions with other medications, please call your primary care physician or contact the ER to speak with the charge nurse. Please make an appointment with your family doctor or the physician you were referred to for follow-up of this visit as instructed on your discharge paperwork. Return to the ER if you are unable to be seen or if you are unable to be seen in a timely manner. Should you experience abdominal pain lasting greater than 6 hours, chest pain, difficulty breathing, fever/chills, numbness/tingling, skin changes or other symptoms that concern you, return to the ED sooner. If you feel worse over the next 24 hours, please return to the ED. We are available 24 hours a day. Thank you for trusting us with your care!

## 2023-03-11 NOTE — ED PROVIDER NOTES
EMERGENCY DEPARTMENT HISTORY AND PHYSICAL EXAM            Please note that this dictation was completed with the assistance of \"Dragon\", the computer voice recognition software. Quite often unanticipated grammatical, syntax, homophones, and other interpretive errors are inadvertently transcribed by the computer software. Please disregard these errors and any errors that have escaped final proofreading. Thank you. Date of Evaluation: 03/12/23  Patient: Jayla Ma Patient Age and Sex: 37 y.o. male   MRN: [id number]  CSN: [id number]  PCP: Yulissa Carpenter MD    History of Present Illness     Chief Complaint   Patient presents with    Knee Pain     Patient ambulatory to triage w c/o R knee pain, states he needs the fluid drained. History Provided By: Patient/family/EMS (if available)    {HPI Limitations (Optional):44289}    HPI: Jayla Ma, 37 y.o. male with past medical history as documented below presents to the ED with c/o of ***. Pt denies any other exacerbating or ameliorating factors. There are no other complaints, changes or physical findings pertinent to the HPI at this time. Nursing Notes were all reviewed and agreed with or any disagreements were addressed in the HPI.     Past History   Past Medical History:  Past Medical History:   Diagnosis Date    Hypercholesterolemia     Hypertension        Past Surgical History:  Past Surgical History:   Procedure Laterality Date    HX ORTHOPAEDIC      Age 13/12: Cut 5 tendons on glass R forearm-> nl now    HX WISDOM TEETH EXTRACTION  2005       Family History:   Family history reviewed and was non-contributory, unless specified below:  Family History   Problem Relation Age of Onset    Diabetes Mother     Hypertension Mother     Elevated Lipids Father     COPD Father     Heart Disease Father     Kidney Disease Father     Cancer Father         throat    Hypertension Father     No Known Problems Sister     No Known Problems Brother     No Known Problems Sister     No Known Problems Brother        Social History:  Social History     Tobacco Use    Smoking status: Former     Packs/day: 0.50     Years: 12.00     Pack years: 6.00     Types: Cigarettes    Smokeless tobacco: Never   Vaping Use    Vaping Use: Never used   Substance Use Topics    Alcohol use: Yes     Alcohol/week: 8.0 standard drinks     Types: 8 Cans of beer per week    Drug use: Never       Allergies: Allergies   Allergen Reactions    Benadryl [Diphenhydramine Hcl] Anxiety     Severe panic attack       Current Medications:  No current facility-administered medications on file prior to encounter. Current Outpatient Medications on File Prior to Encounter   Medication Sig Dispense Refill    methylPREDNISolone (Medrol, Amdi,) 4 mg tablet Please follow instructions on packet 1 Dose Pack 0    diclofenac EC (VOLTAREN) 75 mg EC tablet Take 1 Tablet by mouth two (2) times daily (with meals). 60 Tablet 0    ergocalciferol (ERGOCALCIFEROL) 1,250 mcg (50,000 unit) capsule TAKE 1 CAPSULE BY MOUTH EVERY 7 DAYS 4 Capsule 5    rosuvastatin (CRESTOR) 5 mg tablet Take 1 Tablet by mouth nightly. 30 Tablet 5    lisinopriL (PRINIVIL, ZESTRIL) 10 mg tablet Take 1 Tablet by mouth daily. 30 Tablet 5    elderberry fruit (ELDERBERRY PO) Take  by mouth. omega 3-DHA-EPA-fish oil 1,000 mg (120 mg-180 mg) capsule Take 1 Cap by mouth daily. Review of Systems   A complete ROS was reviewed by me today and all other systems negative, unless otherwise specified below:  Review of Systems  Physical Exam   No data found. Physical Exam  Diagnostic Studies     LABORATORY RESULTS:  I have personally reviewed and interpreted all available laboratory results. No results found for this or any previous visit (from the past 24 hour(s)). RADIOLOGY RESULTS:  I have personally reviewed and interpreted all available imaging studies and agree with radiology interpretation.   No orders to display     CT Results  (Last 48 hours)      None          CXR Results  (Last 48 hours)      None          No orders to display        [address]   I am the first and primary ED physician for this patient's ED visit today. I reviewed our EMR for any past records that may contribute to the patient's current condition, including their past medical, surgical, social and family history. This also includes their most recent ED visits, previous hospitalizations and prior diagnostic data. I have reviewed and summarized the most pertinent findings in my HPI and MDM. Vital Signs Reviewed:  No data found. Pulse Oximetry Analysis: ***% on RA with good pleth    Cardiac Monitor:   Rate: *** bpm  The cardiac monitor revealed the following rhythm as interpreted by me: Normal Sinus Rhythm  Cardiac monitoring was ordered to monitor patient for signs of cardiac dysrhythmia, which they are at risk for based on their history and/or risk for cardiovascular disease and/or metabolic abnormalities. EKG interpretation:   Billable EKG reviewed by ED Physician in the absence of a cardiologist: Yes  Rhythm: {[facility] EKG RHYTHM:53961}; and {[facility] EKG LZZA:13681}. Rate (approx.): ***; Axis: {[facility] EKG AXIS DEVIATION:18188}; P wave: {[facility] EKG P WAVE:88442}; QRS interval: {[facility] EKG QRS INTERVAL:01861}; ST/T wave: {[facility] EKG ST/T VINB:30884}; Other findings: {[facility] EKG OTHER RBLNAZYM:03559}.  This EKG was interpreted by Tim Smith MD     Records Reviewed: Nursing Notes, Old Medical Records, Previous electrocardiograms, Previous Radiology Studies and Previous Laboratory Studies, EMS reports    DIFFERENTIAL DIAGNOSIS AND MDM:  ***    Pertinent Chronic Medical Conditions or Prior Surgeries: Listed under PMH above and includes:  Past Medical History:   Diagnosis Date    Hypercholesterolemia     Hypertension      ***  Review of Prior Records and External Documents:   reviewed: YES - I have reviewed the patient's controlled substance prescription history thru the Prescription Monitoring Program so that the prescription(s) for a controlled substance can be given. Prior hospital discharge summaries and clinic notes reviewed: ***    Independent History: An independent clinically history was obtained from ***family, EMS, police. They state ***    Social Determinants of Health:  Patients evaluation and management were significantly impacted by social determinants of health. Social Determinants affecting Dx or Tx: {Social Barriers:71799}    ***  Social History     Socioeconomic History    Marital status: UNKNOWN   Tobacco Use    Smoking status: Former     Packs/day: 0.50     Years: 12.00     Pack years: 6.00     Types: Cigarettes    Smokeless tobacco: Never   Vaping Use    Vaping Use: Never used   Substance and Sexual Activity    Alcohol use: Yes     Alcohol/week: 8.0 standard drinks     Types: 8 Cans of beer per week    Drug use: Never    Sexual activity: Yes     Partners: Female     Social Determinants of Health     Financial Resource Strain: Low Risk     Difficulty of Paying Living Expenses: Not hard at all   Food Insecurity: No Food Insecurity    Worried About Running Out of Food in the Last Year: Never true    [address] in the Last Year: Never true     ED Course: Progress Notes, Reevaluation, and Consults:  Initial assessment performed. I discussed presenting problems and concerns, and my formulated plan for today's visit with the patient and any available family members. I have encouraged them to ask questions as they arise throughout the visit.      ED Physician Orders:   Orders Placed This Encounter    ketorolac (TORADOL) injection 30 mg    dexamethasone (PF) (DECADRON) 10 mg/mL injection 10 mg    methylPREDNISolone (Medrol, Madi,) 4 mg tablet    diclofenac (Voltaren) 1 % gel    naproxen (NAPROSYN) 500 mg tablet     ED Medications Given:   Medications   ketorolac (TORADOL) injection 30 mg (30 mg IntraMUSCular Given 3/11/23 0626) dexamethasone (PF) (DECADRON) 10 mg/mL injection 10 mg (10 mg IntraMUSCular Given 3/11/23 0623)     Pt received IV/IM medications per above and placed on appropriate cardiac/respiratory monitoring due to drug toxicity. ED Physician Interpretation of Test Results: All results were independently reviewed and interpreted by myself, notably showing:     RADIOLOGY:  Non-plain film images such as CT, ultrasound and MRI are read by the radiologist. Brian Villalpando radiographic images are visualized and preliminarily interpreted by the ED Provider with the below findings:     Imaging interpreted by me: ***    Interpretation per the Radiologist below, if available at the time of this note:     No orders to display     CT Results  (Last 48 hours)      None          CXR Results  (Last 48 hours)      None          No orders to display     My interpretation of EKG: No STEMI. See my EKG interpretation in ED course above. My interpretation of laboratory results:   ***    Amount and/or Complexity of Data Reviewed  HIGH complexity decision making performed   Presentation: ACUTE and SEVERE  Clinical lab tests: ordered as appropriate & reviewed  Tests in the radiology section of CPT®: ordered as appropriate & reviewed  Tests in the medicine section of CPT®: ordered as appropriate & reviewed  Obtain history from someone other than the patient: yes  Review and summarize past medical records: yes  Independent visualization of images, tracings, or specimens: yes    Risks  OTC drugs. Prescription drug management. Parenteral controlled substances. Drug therapy requiring intensive monitoring for toxicity. Decision regarding hospitalization. ***    Progress Note:  I have just re-evaluated the patient. Pt reports improvement of his symptoms after ***.  I have reviewed his vital signs and determined there is currently no worsening in their condition or physical exam. Results have been reviewed with them and their questions have been answered. I will continue to review further results as they come available. Progress Note:      Shared Decision Making: I considered performing *** , but did not after shared decision making with patient due to ***. Consideration for admission/observation for: ***. I engaged patient in shared decision making and he feels significant improvement after ED treatment and is comfortable with discharge with outpatient treatment and PCP/Specialist follow-up. ***  FINAL DIAGNOSIS   Clinical Impression:  1. Bursitis of right knee, unspecified bursa      Attestation:  I am the attending of record for this patient. I personally performed the services described in this documentation on this date, 3/11/2023 for patient, Niles Grijalva. . I have reviewed the chart and verified that the record is accurate and complete.       Maurine Gilford, MD (Electronic Signature) appropriate & reviewed  Tests in the medicine section of CPT®: ordered as appropriate & reviewed  Obtain history from someone other than the patient: yes  Review and summarize past medical records: yes  Independent visualization of images, tracings, or specimens: yes    Risks  OTC drugs. Prescription drug management. Progress Note:  I have just re-evaluated the patient. Pt reports improvement of his symptoms after ED medications. I have reviewed his vital signs and determined there is currently no worsening in their condition or physical exam. Results have been reviewed with them and their questions have been answered. I will continue to review further results as they come available. Progress Note:      Shared Decision Making: I considered performing x-ray imaging, but did not after shared decision making with patient due to presentation not c/w bony trauma or dislocation, pt had recent x-rays that were negative. DISCHARGE  Pt reassessed and symptoms noted to have improved significantly after ED treatment. Alyssa Ro Jr.'s labs and imaging have been reviewed with him and available family. He verbally conveys understanding and agreement of the signs, symptoms, diagnosis, treatment and prognosis and additionally agrees to follow up as recommended with Dr. Fidelina Garland MD and/or specialist as instructed. He agrees with the care plan we have created and conveys that all of his questions have been answered. Additionally, I have put together a packet of discharge instructions for him that include: 1) Educational information regarding their diagnosis, 2) How to care for their diagnosis at home, as well a 3) List of reasons why they would want to return to the ED prior to their follow-up appointment should their condition change or symptoms worsen. I have answered all questions to the patient's satisfaction. Strict return precautions given.  He and/or family conveyed understanding and agreement with care plan. Vital signs stable for discharge. PLAN  1. Return precautions as discussed with patient and available family/caregiver. 2.   Discharge Medication List as of 3/11/2023  6:21 AM        START taking these medications    Details   !! methylPREDNISolone (Medrol, Madi,) 4 mg tablet Take as prescribed, Normal, Disp-1 Dose Pack, R-0      diclofenac (Voltaren) 1 % gel Apply  to affected area four (4) times daily. , Normal, Disp-20 g, R-0      naproxen (NAPROSYN) 500 mg tablet Take 1 Tablet by mouth two (2) times daily (with meals). , Normal, Disp-20 Tablet, R-0       !! - Potential duplicate medications found. Please discuss with provider. CONTINUE these medications which have NOT CHANGED    Details   diclofenac EC (VOLTAREN) 75 mg EC tablet Take 1 Tablet by mouth two (2) times daily (with meals). , Normal, Disp-60 Tablet, R-0      ergocalciferol (ERGOCALCIFEROL) 1,250 mcg (50,000 unit) capsule TAKE 1 CAPSULE BY MOUTH EVERY 7 DAYS, Normal, Disp-4 Capsule, R-5DX Code Needed  . rosuvastatin (CRESTOR) 5 mg tablet Take 1 Tablet by mouth nightly., Normal, Disp-30 Tablet, R-5DX Code Needed  . lisinopriL (PRINIVIL, ZESTRIL) 10 mg tablet Take 1 Tablet by mouth daily. , Normal, Disp-30 Tablet, R-5      elderberry fruit (ELDERBERRY PO) Take  by mouth., Historical Med      omega 3-DHA-EPA-fish oil 1,000 mg (120 mg-180 mg) capsule Take 1 Cap by mouth daily. , Historical Med      !! methylPREDNISolone (Medrol, Madi,) 4 mg tablet Please follow instructions on packet, Normal, Disp-1 Dose Pack, R-0       !! - Potential duplicate medications found. Please discuss with provider.         3.   Follow-up Information       Follow up With Specialties Details Why Contact Info    [facility] EMERGENCY DEPT Emergency Medicine   [address]  [address] [facility]  [phone]    [employer], [address] [facility] [address] On Call Orthopedic Surgery   [address] [place] [facility]  [phone]          Instructed to return to ED if worse  FINAL DIAGNOSIS   Clinical Impression:  1. Bursitis of right knee, unspecified bursa    2. Pain and swelling of right knee    3. Accelerated hypertension      Attestation:  I am the attending of record for this patient. I personally performed the services described in this documentation on this date, 3/11/2023 for patient, Brittany Stewart. . I have reviewed the chart and verified that the record is accurate and complete.       Nasreen Dewitt MD (Electronic Signature)

## 2023-05-03 ENCOUNTER — OFFICE VISIT (OUTPATIENT)
Dept: FAMILY MEDICINE CLINIC | Age: 43
End: 2023-05-03

## 2023-05-03 VITALS
RESPIRATION RATE: 16 BRPM | HEART RATE: 67 BPM | OXYGEN SATURATION: 98 % | HEIGHT: 72 IN | TEMPERATURE: 98.6 F | DIASTOLIC BLOOD PRESSURE: 65 MMHG | BODY MASS INDEX: 36.6 KG/M2 | SYSTOLIC BLOOD PRESSURE: 113 MMHG | WEIGHT: 270.2 LBS

## 2023-05-03 DIAGNOSIS — E55.9 VITAMIN D DEFICIENCY: ICD-10-CM

## 2023-05-03 DIAGNOSIS — R73.03 PREDIABETES: ICD-10-CM

## 2023-05-03 DIAGNOSIS — I10 PRIMARY HYPERTENSION: ICD-10-CM

## 2023-05-03 DIAGNOSIS — E66.01 SEVERE OBESITY (BMI 35.0-39.9) WITH COMORBIDITY (HCC): ICD-10-CM

## 2023-05-03 DIAGNOSIS — Z79.899 ENCOUNTER FOR LONG-TERM (CURRENT) USE OF MEDICATIONS: ICD-10-CM

## 2023-05-03 DIAGNOSIS — E78.5 DYSLIPIDEMIA: ICD-10-CM

## 2023-05-03 DIAGNOSIS — Z00.00 ANNUAL PHYSICAL EXAM: Primary | ICD-10-CM

## 2023-05-03 DIAGNOSIS — Z12.11 SCREEN FOR COLON CANCER: ICD-10-CM

## 2023-05-03 RX ORDER — LISINOPRIL 10 MG/1
10 TABLET ORAL DAILY
Qty: 90 TABLET | Refills: 1 | Status: SHIPPED | OUTPATIENT
Start: 2023-05-03

## 2023-05-03 RX ORDER — ERGOCALCIFEROL 1.25 MG/1
CAPSULE ORAL
Qty: 12 CAPSULE | Refills: 1 | Status: SHIPPED | OUTPATIENT
Start: 2023-05-03

## 2023-05-03 RX ORDER — ROSUVASTATIN CALCIUM 5 MG/1
5 TABLET, COATED ORAL
Qty: 90 TABLET | Refills: 1 | Status: SHIPPED | OUTPATIENT
Start: 2023-05-03

## 2023-05-22 RX ORDER — ROSUVASTATIN CALCIUM 5 MG/1
1 TABLET, COATED ORAL NIGHTLY
COMMUNITY
Start: 2023-05-03

## 2023-05-22 RX ORDER — LISINOPRIL 10 MG/1
10 TABLET ORAL DAILY
COMMUNITY
Start: 2023-05-03

## 2023-05-22 RX ORDER — ERGOCALCIFEROL 1.25 MG/1
CAPSULE ORAL
COMMUNITY
Start: 2023-05-03

## 2023-08-15 DIAGNOSIS — I10 ESSENTIAL (PRIMARY) HYPERTENSION: ICD-10-CM

## 2023-08-15 RX ORDER — LISINOPRIL 10 MG/1
TABLET ORAL
Qty: 30 TABLET | Refills: 2 | Status: SHIPPED | OUTPATIENT
Start: 2023-08-15

## 2023-09-03 ENCOUNTER — HOSPITAL ENCOUNTER (EMERGENCY)
Facility: HOSPITAL | Age: 43
Discharge: HOME | End: 2023-09-03
Payer: COMMERCIAL

## 2023-09-03 VITALS
RESPIRATION RATE: 16 BRPM | HEIGHT: 72 IN | BODY MASS INDEX: 36.57 KG/M2 | HEART RATE: 64 BPM | WEIGHT: 270 LBS | SYSTOLIC BLOOD PRESSURE: 142 MMHG | OXYGEN SATURATION: 98 % | DIASTOLIC BLOOD PRESSURE: 89 MMHG | TEMPERATURE: 98.7 F

## 2023-09-03 DIAGNOSIS — M54.16 LUMBAR BACK PAIN WITH RADICULOPATHY AFFECTING LEFT LOWER EXTREMITY: Primary | ICD-10-CM

## 2023-09-03 PROCEDURE — 99283 EMERGENCY DEPT VISIT LOW MDM: CPT

## 2023-09-03 PROCEDURE — 6370000000 HC RX 637 (ALT 250 FOR IP): Performed by: PHYSICIAN ASSISTANT

## 2023-09-03 RX ORDER — OXYCODONE HYDROCHLORIDE 5 MG/1
5 TABLET ORAL EVERY 6 HOURS PRN
Qty: 10 TABLET | Refills: 0 | Status: SHIPPED | OUTPATIENT
Start: 2023-09-03 | End: 2023-09-06

## 2023-09-03 RX ORDER — NAPROXEN 250 MG/1
250 TABLET ORAL 2 TIMES DAILY WITH MEALS
Status: DISCONTINUED | OUTPATIENT
Start: 2023-09-03 | End: 2023-09-03 | Stop reason: HOSPADM

## 2023-09-03 RX ORDER — CYCLOBENZAPRINE HCL 10 MG
10 TABLET ORAL 3 TIMES DAILY PRN
Qty: 15 TABLET | Refills: 0 | Status: SHIPPED | OUTPATIENT
Start: 2023-09-03 | End: 2023-09-08

## 2023-09-03 RX ORDER — OXYCODONE HYDROCHLORIDE 5 MG/1
5 TABLET ORAL
Status: COMPLETED | OUTPATIENT
Start: 2023-09-03 | End: 2023-09-03

## 2023-09-03 RX ORDER — NAPROXEN 250 MG/1
250 TABLET ORAL 2 TIMES DAILY WITH MEALS
Qty: 14 TABLET | Refills: 0 | Status: SHIPPED | OUTPATIENT
Start: 2023-09-03 | End: 2023-09-10

## 2023-09-03 RX ADMIN — OXYCODONE HYDROCHLORIDE 5 MG: 5 TABLET ORAL at 07:41

## 2023-09-03 RX ADMIN — NAPROXEN 250 MG: 250 TABLET ORAL at 07:41

## 2023-09-03 ASSESSMENT — PAIN SCALES - GENERAL
PAINLEVEL_OUTOF10: 10
PAINLEVEL_OUTOF10: 8

## 2023-09-03 ASSESSMENT — ENCOUNTER SYMPTOMS
SHORTNESS OF BREATH: 0
BACK PAIN: 1
ABDOMINAL PAIN: 0

## 2023-09-03 ASSESSMENT — PAIN - FUNCTIONAL ASSESSMENT: PAIN_FUNCTIONAL_ASSESSMENT: 0-10

## 2023-09-03 NOTE — ED PROVIDER NOTES
PRINIVIL;ZESTRIL  TAKE 1 TABLET BY MOUTH EVERY DAY     rosuvastatin 5 MG tablet  Commonly known as: CRESTOR               Where to Get Your Medications        These medications were sent to [facility]/pharmacy #[id number]- [address] [facility], [place] - [address] [place] [address] [phone] Eusebia Arrington [phone]  [address] [facility], [address] [place] [address] Drive      Hours: 24-hours Phone: [phone]   cyclobenzaprine 10 MG tablet  naproxen 250 MG tablet  oxyCODONE 5 MG immediate release tablet           DISCONTINUED MEDICATIONS:  Discharge Medication List as of 9/3/2023  8:04 AM          I have seen and evaluated the patient autonomously. My supervision physician was on site and available for consultation if needed. I am the Primary Clinician of Record. Jacqueline Mendoza, [address] [place] [address] (electronically signed)    (Please note that parts of this dictation were completed with voice recognition software. Quite often unanticipated grammatical, syntax, homophones, and other interpretive errors are inadvertently transcribed by the computer software. Please disregards these errors.  Please excuse any errors that have escaped final proofreading.)         Jacqueline Mendoza [address] [place] [address]  09/03/23 [id number]

## 2023-11-06 ENCOUNTER — OFFICE VISIT (OUTPATIENT)
Age: 43
End: 2023-11-06
Payer: COMMERCIAL

## 2023-11-06 VITALS
SYSTOLIC BLOOD PRESSURE: 106 MMHG | OXYGEN SATURATION: 100 % | BODY MASS INDEX: 36.57 KG/M2 | HEIGHT: 72 IN | DIASTOLIC BLOOD PRESSURE: 70 MMHG | WEIGHT: 270 LBS | TEMPERATURE: 98.2 F | HEART RATE: 67 BPM | RESPIRATION RATE: 16 BRPM

## 2023-11-06 DIAGNOSIS — E55.9 VITAMIN D DEFICIENCY, UNSPECIFIED: ICD-10-CM

## 2023-11-06 DIAGNOSIS — Z79.899 ENCOUNTER FOR LONG-TERM (CURRENT) USE OF MEDICATIONS: ICD-10-CM

## 2023-11-06 DIAGNOSIS — R73.03 PREDIABETES: ICD-10-CM

## 2023-11-06 DIAGNOSIS — E78.00 HYPERCHOLESTEROLEMIA: ICD-10-CM

## 2023-11-06 DIAGNOSIS — I10 PRIMARY HYPERTENSION: ICD-10-CM

## 2023-11-06 DIAGNOSIS — I10 PRIMARY HYPERTENSION: Primary | ICD-10-CM

## 2023-11-06 DIAGNOSIS — Z23 NEED FOR VACCINATION: ICD-10-CM

## 2023-11-06 LAB
ALBUMIN SERPL-MCNC: 3.9 G/DL (ref 3.5–5)
ALBUMIN/GLOB SERPL: 1.2 (ref 1.1–2.2)
ALP SERPL-CCNC: 58 U/L (ref 45–117)
ALT SERPL-CCNC: 32 U/L (ref 12–78)
ANION GAP SERPL CALC-SCNC: 5 MMOL/L (ref 5–15)
AST SERPL-CCNC: 21 U/L (ref 15–37)
BILIRUB SERPL-MCNC: 0.4 MG/DL (ref 0.2–1)
BUN SERPL-MCNC: 17 MG/DL (ref 6–20)
BUN/CREAT SERPL: 16 (ref 12–20)
CALCIUM SERPL-MCNC: 9 MG/DL (ref 8.5–10.1)
CHLORIDE SERPL-SCNC: 104 MMOL/L (ref 97–108)
CO2 SERPL-SCNC: 31 MMOL/L (ref 21–32)
CREAT SERPL-MCNC: 1.08 MG/DL (ref 0.7–1.3)
EST. AVERAGE GLUCOSE BLD GHB EST-MCNC: 131 MG/DL
GLOBULIN SER CALC-MCNC: 3.3 G/DL (ref 2–4)
GLUCOSE SERPL-MCNC: 111 MG/DL (ref 65–100)
HBA1C MFR BLD: 6.2 % (ref 4–5.6)
POTASSIUM SERPL-SCNC: 4.6 MMOL/L (ref 3.5–5.1)
PROT SERPL-MCNC: 7.2 G/DL (ref 6.4–8.2)
SODIUM SERPL-SCNC: 140 MMOL/L (ref 136–145)

## 2023-11-06 PROCEDURE — 3078F DIAST BP <80 MM HG: CPT | Performed by: FAMILY MEDICINE

## 2023-11-06 PROCEDURE — 99214 OFFICE O/P EST MOD 30 MIN: CPT | Performed by: FAMILY MEDICINE

## 2023-11-06 PROCEDURE — 3074F SYST BP LT 130 MM HG: CPT | Performed by: FAMILY MEDICINE

## 2023-11-06 PROCEDURE — 90674 CCIIV4 VAC NO PRSV 0.5 ML IM: CPT | Performed by: FAMILY MEDICINE

## 2023-11-06 PROCEDURE — 90471 IMMUNIZATION ADMIN: CPT | Performed by: FAMILY MEDICINE

## 2023-11-06 RX ORDER — LISINOPRIL 10 MG/1
10 TABLET ORAL DAILY
Qty: 90 TABLET | Refills: 1 | Status: SHIPPED | OUTPATIENT
Start: 2023-11-06

## 2023-11-06 RX ORDER — ERGOCALCIFEROL 1.25 MG/1
1 CAPSULE ORAL
Qty: 12 CAPSULE | Refills: 1 | Status: SHIPPED | OUTPATIENT
Start: 2023-11-06

## 2023-11-06 RX ORDER — GABAPENTIN 100 MG/1
100 CAPSULE ORAL 3 TIMES DAILY
COMMUNITY
Start: 2023-10-10

## 2023-11-06 RX ORDER — DICLOFENAC SODIUM 75 MG/1
75 TABLET, DELAYED RELEASE ORAL 2 TIMES DAILY
COMMUNITY
Start: 2023-09-12

## 2023-11-06 RX ORDER — ROSUVASTATIN CALCIUM 5 MG/1
5 TABLET, COATED ORAL NIGHTLY
Qty: 90 TABLET | Refills: 1 | Status: SHIPPED | OUTPATIENT
Start: 2023-11-06

## 2023-11-06 RX ORDER — CHLORAL HYDRATE 500 MG
1 CAPSULE ORAL DAILY
COMMUNITY

## 2023-11-06 SDOH — ECONOMIC STABILITY: FOOD INSECURITY: WITHIN THE PAST 12 MONTHS, YOU WORRIED THAT YOUR FOOD WOULD RUN OUT BEFORE YOU GOT MONEY TO BUY MORE.: NEVER TRUE

## 2023-11-06 SDOH — ECONOMIC STABILITY: INCOME INSECURITY: HOW HARD IS IT FOR YOU TO PAY FOR THE VERY BASICS LIKE FOOD, HOUSING, MEDICAL CARE, AND HEATING?: NOT HARD AT ALL

## 2023-11-06 SDOH — ECONOMIC STABILITY: HOUSING INSECURITY
IN THE LAST 12 MONTHS, WAS THERE A TIME WHEN YOU DID NOT HAVE A STEADY PLACE TO SLEEP OR SLEPT IN A SHELTER (INCLUDING NOW)?: NO

## 2023-11-06 SDOH — ECONOMIC STABILITY: FOOD INSECURITY: WITHIN THE PAST 12 MONTHS, THE FOOD YOU BOUGHT JUST DIDN'T LAST AND YOU DIDN'T HAVE MONEY TO GET MORE.: NEVER TRUE

## 2023-11-06 ASSESSMENT — ANXIETY QUESTIONNAIRES
1. FEELING NERVOUS, ANXIOUS, OR ON EDGE: 0
5. BEING SO RESTLESS THAT IT IS HARD TO SIT STILL: 0
3. WORRYING TOO MUCH ABOUT DIFFERENT THINGS: 0
GAD7 TOTAL SCORE: 0
6. BECOMING EASILY ANNOYED OR IRRITABLE: 0
IF YOU CHECKED OFF ANY PROBLEMS ON THIS QUESTIONNAIRE, HOW DIFFICULT HAVE THESE PROBLEMS MADE IT FOR YOU TO DO YOUR WORK, TAKE CARE OF THINGS AT HOME, OR GET ALONG WITH OTHER PEOPLE: NOT DIFFICULT AT ALL
4. TROUBLE RELAXING: 0
2. NOT BEING ABLE TO STOP OR CONTROL WORRYING: 0
7. FEELING AFRAID AS IF SOMETHING AWFUL MIGHT HAPPEN: 0

## 2023-11-06 ASSESSMENT — PATIENT HEALTH QUESTIONNAIRE - PHQ9
1. LITTLE INTEREST OR PLEASURE IN DOING THINGS: 0
SUM OF ALL RESPONSES TO PHQ QUESTIONS 1-9: 0
SUM OF ALL RESPONSES TO PHQ QUESTIONS 1-9: 0
2. FEELING DOWN, DEPRESSED OR HOPELESS: 0
SUM OF ALL RESPONSES TO PHQ QUESTIONS 1-9: 0
SUM OF ALL RESPONSES TO PHQ QUESTIONS 1-9: 0
SUM OF ALL RESPONSES TO PHQ9 QUESTIONS 1 & 2: 0

## 2023-11-06 NOTE — PROGRESS NOTES
Chief Complaint   Patient presents with    Hypertension    Cholesterol Problem     6 mo check    1. Have you been to the ER, urgent care clinic since your last visit? Hospitalized since your last visit? Yes ER    2. Have you seen or consulted any other health care providers outside of the [address] [place] [address] Avenue since your last visit? Include any pap smears or colon screening. Yes Reason for visit: Ortho         Order placed for Flucelvax from provider Dr. Chasity Urias, verified by Verbal Order Read Back with provider. Observed for 15 min, no reaction.

## 2024-04-27 ENCOUNTER — OFFICE VISIT (OUTPATIENT)
Age: 44
End: 2024-04-27

## 2024-04-27 VITALS
SYSTOLIC BLOOD PRESSURE: 109 MMHG | HEIGHT: 72 IN | BODY MASS INDEX: 35.95 KG/M2 | TEMPERATURE: 101.8 F | OXYGEN SATURATION: 97 % | HEART RATE: 100 BPM | WEIGHT: 265.4 LBS | DIASTOLIC BLOOD PRESSURE: 68 MMHG

## 2024-04-27 DIAGNOSIS — J02.0 ACUTE STREPTOCOCCAL PHARYNGITIS: Primary | ICD-10-CM

## 2024-04-27 LAB
GROUP A STREP ANTIGEN, POC: POSITIVE
VALID INTERNAL CONTROL, POC: ABNORMAL

## 2024-04-27 RX ORDER — ASPIRIN 81 MG/1
81 TABLET, CHEWABLE ORAL DAILY
COMMUNITY
Start: 2024-04-22 | End: 2024-05-06

## 2024-04-27 RX ORDER — AMOXICILLIN 500 MG/1
500 CAPSULE ORAL 2 TIMES DAILY
Qty: 20 CAPSULE | Refills: 0 | Status: SHIPPED | OUTPATIENT
Start: 2024-04-27 | End: 2024-05-06 | Stop reason: ALTCHOICE

## 2024-04-27 RX ORDER — KETOROLAC TROMETHAMINE 10 MG/1
10 TABLET, FILM COATED ORAL EVERY 6 HOURS
COMMUNITY
Start: 2024-04-22 | End: 2024-05-01 | Stop reason: ALTCHOICE

## 2024-04-27 RX ORDER — ACETAMINOPHEN 500 MG
1000 TABLET ORAL 3 TIMES DAILY
COMMUNITY
Start: 2024-04-22

## 2024-04-27 RX ORDER — MULTIVITAMIN
1 CAPSULE ORAL DAILY
COMMUNITY

## 2024-04-27 RX ORDER — ONDANSETRON 4 MG/1
4 TABLET, ORALLY DISINTEGRATING ORAL EVERY 8 HOURS PRN
COMMUNITY
Start: 2024-04-22

## 2024-04-27 RX ORDER — TRAMADOL HYDROCHLORIDE 50 MG/1
50 TABLET ORAL EVERY 6 HOURS PRN
COMMUNITY
Start: 2024-04-22

## 2024-04-27 NOTE — PROGRESS NOTES
Patient requested that Rx be sent to another pharmacy. Amoxil 500mg cap was  called in to [facility].   Patient's wife called back requesting that the Rx be in liquid form. After speaking with provider, called and adjusted Amoxil to liquid suspension.

## 2024-04-27 NOTE — PROGRESS NOTES
Subjective: (As above and below)     The patient/guardian gave verbal consent to treat.        Chief Complaint   Patient presents with    New Patient    Pharyngitis     Painful to swallow, throat feels like something is \"sticking\"       Pratik Richmond is a 44 y.o. male who presents for evaluation of :   Sore throat and fever  Over the past couple of days  Persistent worsening.      Review of Systems    Review of Systems - negative except as listed above    Reviewed PmHx, RxHx, FmHx, SocHx, AllgHx and updated in chart.  Family History   Problem Relation Age of Onset    Hypertension Mother     Diabetes Mother     Elevated Lipids Father     Kidney Disease Father     Heart Disease Father     COPD Father     Hypertension Father     No Known Problems Brother     No Known Problems Sister     No Known Problems Brother     Cancer Father         throat    No Known Problems Sister        Past Medical History:   Diagnosis Date    Hypercholesterolemia     Hypertension       Social History     Socioeconomic History    Marital status: Unknown   Tobacco Use    Smoking status: Former     Current packs/day: 0.50     Average packs/day: 0.5 packs/day for 10.0 years (5.0 ttl pk-yrs)     Types: Cigarettes    Smokeless tobacco: Never   Vaping Use    Vaping Use: Never used   Substance and Sexual Activity    Alcohol use: Yes     Alcohol/week: 8.0 standard drinks of alcohol    Drug use: Never    Sexual activity: Yes     Partners: Female     Social Determinants of Health     Financial Resource Strain: Low Risk  (11/6/2023)    Overall Financial Resource Strain (CARDIA)     Difficulty of Paying Living Expenses: Not hard at all   Transportation Needs: Unknown (11/6/2023)    PRAPARE - Transportation     Lack of Transportation (Non-Medical): No   Housing Stability: Unknown (11/6/2023)    Housing Stability Vital Sign     Unstable Housing in the Last Year: No          Current Outpatient Medications   Medication Sig    traMADol (ULTRAM) 50 MG

## 2024-04-29 DIAGNOSIS — I10 PRIMARY HYPERTENSION: ICD-10-CM

## 2024-04-30 NOTE — TELEPHONE ENCOUNTER
Last appointment: 11/6/23  Next appointment: 5/6/24  Previous refill encounter(s): 11/6/23 #90 with 1 refill    Requested Prescriptions     Pending Prescriptions Disp Refills    lisinopril (PRINIVIL;ZESTRIL) 10 MG tablet [Pharmacy Med Name: LISINOPRIL 10 MG TABLET] 90 tablet 1     Sig: TAKE 1 TABLET BY MOUTH EVERY DAY         For Pharmacy Admin Tracking Only    Program: Medication Refill  CPA in place:    Recommendation Provided To:   Intervention Detail: New Rx: 1, reason: Patient Preference  Intervention Accepted By:   Gap Closed?:    Time Spent (min): 5

## 2024-05-01 RX ORDER — LISINOPRIL 10 MG/1
10 TABLET ORAL DAILY
Qty: 90 TABLET | Refills: 0 | Status: SHIPPED | OUTPATIENT
Start: 2024-05-01

## 2024-05-06 ENCOUNTER — OFFICE VISIT (OUTPATIENT)
Age: 44
End: 2024-05-06
Payer: COMMERCIAL

## 2024-05-06 VITALS
HEIGHT: 72 IN | OXYGEN SATURATION: 100 % | WEIGHT: 265.8 LBS | RESPIRATION RATE: 16 BRPM | SYSTOLIC BLOOD PRESSURE: 104 MMHG | HEART RATE: 72 BPM | TEMPERATURE: 96.6 F | BODY MASS INDEX: 36 KG/M2 | DIASTOLIC BLOOD PRESSURE: 70 MMHG

## 2024-05-06 DIAGNOSIS — Z79.899 ENCOUNTER FOR LONG-TERM (CURRENT) USE OF MEDICATIONS: ICD-10-CM

## 2024-05-06 DIAGNOSIS — E55.9 VITAMIN D DEFICIENCY: ICD-10-CM

## 2024-05-06 DIAGNOSIS — Z00.00 ANNUAL PHYSICAL EXAM: ICD-10-CM

## 2024-05-06 DIAGNOSIS — R73.03 PREDIABETES: ICD-10-CM

## 2024-05-06 DIAGNOSIS — R39.12 WEAK URINE STREAM: ICD-10-CM

## 2024-05-06 DIAGNOSIS — Z00.00 ANNUAL PHYSICAL EXAM: Primary | ICD-10-CM

## 2024-05-06 DIAGNOSIS — E78.00 HYPERCHOLESTEROLEMIA: ICD-10-CM

## 2024-05-06 DIAGNOSIS — E66.01 SEVERE OBESITY (BMI 35.0-39.9) WITH COMORBIDITY (HCC): ICD-10-CM

## 2024-05-06 PROBLEM — E66.9 OBESITY (BMI 30-39.9): Status: RESOLVED | Noted: 2019-07-09 | Resolved: 2024-05-06

## 2024-05-06 LAB
25(OH)D3 SERPL-MCNC: 87.8 NG/ML (ref 30–100)
ALBUMIN SERPL-MCNC: 3.3 G/DL (ref 3.5–5)
ALBUMIN/GLOB SERPL: 0.8 (ref 1.1–2.2)
ALP SERPL-CCNC: 65 U/L (ref 45–117)
ALT SERPL-CCNC: 28 U/L (ref 12–78)
ANION GAP SERPL CALC-SCNC: 0 MMOL/L (ref 5–15)
APPEARANCE UR: CLEAR
AST SERPL-CCNC: 17 U/L (ref 15–37)
BACTERIA URNS QL MICRO: NEGATIVE /HPF
BILIRUB SERPL-MCNC: 0.3 MG/DL (ref 0.2–1)
BILIRUB UR QL: NEGATIVE
BUN SERPL-MCNC: 15 MG/DL (ref 6–20)
BUN/CREAT SERPL: 13 (ref 12–20)
CALCIUM SERPL-MCNC: 9.4 MG/DL (ref 8.5–10.1)
CHLORIDE SERPL-SCNC: 106 MMOL/L (ref 97–108)
CHOLEST SERPL-MCNC: 161 MG/DL
CO2 SERPL-SCNC: 32 MMOL/L (ref 21–32)
COLOR UR: NORMAL
CREAT SERPL-MCNC: 1.15 MG/DL (ref 0.7–1.3)
EPITH CASTS URNS QL MICRO: NORMAL /LPF
ERYTHROCYTE [DISTWIDTH] IN BLOOD BY AUTOMATED COUNT: 13.8 % (ref 11.5–14.5)
EST. AVERAGE GLUCOSE BLD GHB EST-MCNC: 131 MG/DL
GLOBULIN SER CALC-MCNC: 3.9 G/DL (ref 2–4)
GLUCOSE SERPL-MCNC: 118 MG/DL (ref 65–100)
GLUCOSE UR STRIP.AUTO-MCNC: NEGATIVE MG/DL
HBA1C MFR BLD: 6.2 % (ref 4–5.6)
HCT VFR BLD AUTO: 45.2 % (ref 36.6–50.3)
HDLC SERPL-MCNC: 33 MG/DL
HDLC SERPL: 4.9 (ref 0–5)
HGB BLD-MCNC: 14 G/DL (ref 12.1–17)
HGB UR QL STRIP: NEGATIVE
HYALINE CASTS URNS QL MICRO: NORMAL /LPF (ref 0–5)
KETONES UR QL STRIP.AUTO: NEGATIVE MG/DL
LDLC SERPL CALC-MCNC: 109.6 MG/DL (ref 0–100)
LEUKOCYTE ESTERASE UR QL STRIP.AUTO: NEGATIVE
MCH RBC QN AUTO: 26.9 PG (ref 26–34)
MCHC RBC AUTO-ENTMCNC: 31 G/DL (ref 30–36.5)
MCV RBC AUTO: 86.8 FL (ref 80–99)
NITRITE UR QL STRIP.AUTO: NEGATIVE
NRBC # BLD: 0 K/UL (ref 0–0.01)
NRBC BLD-RTO: 0 PER 100 WBC
PH UR STRIP: 6 (ref 5–8)
PLATELET # BLD AUTO: 367 K/UL (ref 150–400)
PMV BLD AUTO: 10.3 FL (ref 8.9–12.9)
POTASSIUM SERPL-SCNC: 4.5 MMOL/L (ref 3.5–5.1)
PROT SERPL-MCNC: 7.2 G/DL (ref 6.4–8.2)
PROT UR STRIP-MCNC: NEGATIVE MG/DL
PSA SERPL-MCNC: 3 NG/ML (ref 0.01–4)
RBC # BLD AUTO: 5.21 M/UL (ref 4.1–5.7)
RBC #/AREA URNS HPF: NORMAL /HPF (ref 0–5)
SODIUM SERPL-SCNC: 138 MMOL/L (ref 136–145)
SP GR UR REFRACTOMETRY: 1.01 (ref 1–1.03)
TRIGL SERPL-MCNC: 92 MG/DL
TSH SERPL DL<=0.05 MIU/L-ACNC: 0.87 UIU/ML (ref 0.36–3.74)
URINE CULTURE IF INDICATED: NORMAL
UROBILINOGEN UR QL STRIP.AUTO: 0.2 EU/DL (ref 0.2–1)
VLDLC SERPL CALC-MCNC: 18.4 MG/DL
WBC # BLD AUTO: 8.9 K/UL (ref 4.1–11.1)
WBC URNS QL MICRO: NORMAL /HPF (ref 0–4)

## 2024-05-06 PROCEDURE — 3078F DIAST BP <80 MM HG: CPT | Performed by: FAMILY MEDICINE

## 2024-05-06 PROCEDURE — 99396 PREV VISIT EST AGE 40-64: CPT | Performed by: FAMILY MEDICINE

## 2024-05-06 PROCEDURE — 3074F SYST BP LT 130 MM HG: CPT | Performed by: FAMILY MEDICINE

## 2024-05-06 RX ORDER — ERGOCALCIFEROL 1.25 MG/1
1 CAPSULE ORAL
Qty: 12 CAPSULE | Refills: 1 | Status: SHIPPED | OUTPATIENT
Start: 2024-05-06

## 2024-05-06 RX ORDER — ROSUVASTATIN CALCIUM 5 MG/1
5 TABLET, COATED ORAL NIGHTLY
Qty: 90 TABLET | Refills: 1 | Status: SHIPPED | OUTPATIENT
Start: 2024-05-06

## 2024-05-06 RX ORDER — AMOXICILLIN 400 MG/5ML
POWDER, FOR SUSPENSION ORAL
COMMUNITY
Start: 2024-04-27

## 2024-05-06 SDOH — ECONOMIC STABILITY: FOOD INSECURITY: WITHIN THE PAST 12 MONTHS, YOU WORRIED THAT YOUR FOOD WOULD RUN OUT BEFORE YOU GOT MONEY TO BUY MORE.: NEVER TRUE

## 2024-05-06 SDOH — ECONOMIC STABILITY: FOOD INSECURITY: WITHIN THE PAST 12 MONTHS, THE FOOD YOU BOUGHT JUST DIDN'T LAST AND YOU DIDN'T HAVE MONEY TO GET MORE.: NEVER TRUE

## 2024-05-06 SDOH — ECONOMIC STABILITY: INCOME INSECURITY: HOW HARD IS IT FOR YOU TO PAY FOR THE VERY BASICS LIKE FOOD, HOUSING, MEDICAL CARE, AND HEATING?: NOT HARD AT ALL

## 2024-05-06 ASSESSMENT — PATIENT HEALTH QUESTIONNAIRE - PHQ9
SUM OF ALL RESPONSES TO PHQ9 QUESTIONS 1 & 2: 0
SUM OF ALL RESPONSES TO PHQ QUESTIONS 1-9: 0
1. LITTLE INTEREST OR PLEASURE IN DOING THINGS: NOT AT ALL
2. FEELING DOWN, DEPRESSED OR HOPELESS: NOT AT ALL

## 2024-05-06 NOTE — PROGRESS NOTES
Pratik Richmond is a 44 y.o. male    Works construction       Assessment/Plans:    1. Annual physical exam  -     CBC; Future  -     Comprehensive Metabolic Panel; Future  -     TSH; Future  -     Hemoglobin A1C; Future  -     Lipid Panel; Future  -     Urinalysis with Reflex to Culture; Future  -     PSA, Diagnostic; Future  -     Vitamin D 25 Hydroxy; Future  2. Hypercholesterolemia  -     Lipid Panel; Future  -     rosuvastatin (CRESTOR) 5 MG tablet; Take 1 tablet by mouth nightly, Disp-90 tablet, R-1Normal  3. Weak urine stream  -     PSA, Diagnostic; Future  4. Prediabetes  -     Hemoglobin A1C; Future  5. Severe obesity (BMI 35.0-39.9) with comorbidity (HCC)  6. Vitamin D deficiency  -     Vitamin D 25 Hydroxy; Future  -     ergocalciferol (ERGOCALCIFEROL) 1.25 MG (64491 UT) capsule; Take 1 capsule by mouth every 7 days, Disp-12 capsule, R-1Normal  7. Encounter for long-term (current) use of medications  -     CBC; Future  -     Comprehensive Metabolic Panel; Future  -     TSH; Future  -     Hemoglobin A1C; Future  -     Lipid Panel; Future  -     Urinalysis with Reflex to Culture; Future  -     PSA, Diagnostic; Future  -     Vitamin D 25 Hydroxy; Future      Discussed plans, risk/benefits of treatments/observations.   Through the use of shared decision making, above plans were agreed upon.   Medication compliance advised.  Patient verbalized understanding.     Return in about 6 months (around 11/6/2024) for HLD, prediabetes, meds, labs, sooner as needed.      Subjective:     has a past medical history of Hypercholesterolemia and Hypertension.    HTN: BP have been low past 3 doctors visits.  Lisinopril 10mg   We'll d/c     HLD: Crestor     Prediabetes A1C 6.2% 11/2023, 6.2% 3/2022     Decrease urine stream     Denies CP or SOB.     Never had a colonoscopy  Ordered, need to repeat, not good prep      Vitals:    05/06/24 0814   BP: 104/70   Pulse: 72   Resp: 16   Temp: (!) 96.6 °F (35.9 °C)   TempSrc: Oral   SpO2:

## 2024-05-06 NOTE — PROGRESS NOTES
Chief Complaint   Patient presents with    Annual Exam       1. Have you been to the ER, urgent care clinic since your last visit?  Hospitalized since your last visit?Yes UC    2. Have you seen or consulted any other health care providers outside of the [facility] since your last visit?  Include any pap smears or colon screening. Yes

## 2025-01-13 ENCOUNTER — OFFICE VISIT (OUTPATIENT)
Age: 45
End: 2025-01-13
Payer: COMMERCIAL

## 2025-01-13 DIAGNOSIS — E11.9 TYPE 2 DIABETES MELLITUS WITHOUT COMPLICATION, WITHOUT LONG-TERM CURRENT USE OF INSULIN (HCC): Primary | ICD-10-CM

## 2025-01-13 PROCEDURE — G0108 DIAB MANAGE TRN  PER INDIV: HCPCS

## 2025-01-13 NOTE — PROGRESS NOTES
can use my daily blood sugars to adjust my diet, my activity, and/or my insulin.: 6 (2025  9:00 AM)  When something out of my normal routine happens, I am confident that I can problem-solve and keep my diabetes on track.: 7 (2025  9:00 AM)    [facility] DIABETES PREPAREDNESS  Within the next month, I will begin to eat more balanced meals and snacks.: 7.0 (2025  9:00 AM)  Within the next month, I will choose an exercise activity and I will start fitting it into my schedule.: 7.0 (2025  9:00 AM)  Within the next month, I will make a list of stress management options that work for me.: 7.0 (2025  9:00 AM)  Within the next month, I will consistently plan ahead to prevent low blood sugars: 6 (2025  9:00 AM)  Within the next month, I will start adjusting my insulin doses on my own.: 0 (2025  9:00 AM)  Within the next month, I will begin making changes to my diabetes management based on my daily blood sugars (eg - eating, activity and/or insulin).: 7.0 (2025  9:00 AM)  Within the next month, I will begin making changes to my diabetes management to meet my overall goals (eg - eating, activity and/or insulin).: 7.0 (2025  9:00 AM)    [facility] DIABETES SKILLS SCORIN.33 (2025  9:00 AM)  [facility] DIABETES CONFIDENCE SCORIN.43 (2025  9:00 AM)  [facility] DIABETES PREPAREDNESS SCORIN.86 (2025  9:00 AM)  [facility] DIABETES SCPI TOTAL SCORE: 5.21 (2025  9:00 AM)

## 2025-05-21 ENCOUNTER — HOSPITAL ENCOUNTER (EMERGENCY)
Facility: HOSPITAL | Age: 45
Discharge: HOME | End: 2025-05-22
Payer: COMMERCIAL

## 2025-05-21 ENCOUNTER — APPOINTMENT (OUTPATIENT)
Facility: HOSPITAL | Age: 45
End: 2025-05-21
Payer: COMMERCIAL

## 2025-05-21 DIAGNOSIS — R07.9 CHEST PAIN, UNSPECIFIED TYPE: Primary | ICD-10-CM

## 2025-05-21 LAB
ALBUMIN SERPL-MCNC: 3.6 G/DL (ref 3.5–5)
ALBUMIN/GLOB SERPL: 0.9 (ref 1.1–2.2)
ALP SERPL-CCNC: 59 U/L (ref 45–117)
ALT SERPL-CCNC: 29 U/L (ref 12–78)
ANION GAP SERPL CALC-SCNC: 4 MMOL/L (ref 2–12)
AST SERPL-CCNC: 18 U/L (ref 15–37)
BASOPHILS # BLD: 0.05 K/UL (ref 0–0.1)
BASOPHILS NFR BLD: 0.6 % (ref 0–1)
BILIRUB SERPL-MCNC: 0.4 MG/DL (ref 0.2–1)
BUN SERPL-MCNC: 19 MG/DL (ref 6–20)
BUN/CREAT SERPL: 16 (ref 12–20)
CALCIUM SERPL-MCNC: 9.1 MG/DL (ref 8.5–10.1)
CHLORIDE SERPL-SCNC: 105 MMOL/L (ref 97–108)
CO2 SERPL-SCNC: 30 MMOL/L (ref 21–32)
CREAT SERPL-MCNC: 1.2 MG/DL (ref 0.7–1.3)
DIFFERENTIAL METHOD BLD: NORMAL
EOSINOPHIL # BLD: 0.14 K/UL (ref 0–0.4)
EOSINOPHIL NFR BLD: 1.8 % (ref 0–7)
ERYTHROCYTE [DISTWIDTH] IN BLOOD BY AUTOMATED COUNT: 13.7 % (ref 11.5–14.5)
GLOBULIN SER CALC-MCNC: 3.9 G/DL (ref 2–4)
GLUCOSE SERPL-MCNC: 94 MG/DL (ref 65–100)
HCT VFR BLD AUTO: 47.1 % (ref 36.6–50.3)
HGB BLD-MCNC: 14.7 G/DL (ref 12.1–17)
IMM GRANULOCYTES # BLD AUTO: 0.02 K/UL (ref 0–0.04)
IMM GRANULOCYTES NFR BLD AUTO: 0.3 % (ref 0–0.5)
LYMPHOCYTES # BLD: 3.28 K/UL (ref 0.8–3.5)
LYMPHOCYTES NFR BLD: 41.3 % (ref 12–49)
MCH RBC QN AUTO: 27.5 PG (ref 26–34)
MCHC RBC AUTO-ENTMCNC: 31.2 G/DL (ref 30–36.5)
MCV RBC AUTO: 88 FL (ref 80–99)
MONOCYTES # BLD: 0.72 K/UL (ref 0–1)
MONOCYTES NFR BLD: 9.1 % (ref 5–13)
NEUTS SEG # BLD: 3.73 K/UL (ref 1.8–8)
NEUTS SEG NFR BLD: 46.9 % (ref 32–75)
NRBC # BLD: 0 K/UL (ref 0–0.01)
NRBC BLD-RTO: 0 PER 100 WBC
PLATELET # BLD AUTO: 256 K/UL (ref 150–400)
PMV BLD AUTO: 10.2 FL (ref 8.9–12.9)
POTASSIUM SERPL-SCNC: 4.1 MMOL/L (ref 3.5–5.1)
PROT SERPL-MCNC: 7.5 G/DL (ref 6.4–8.2)
RBC # BLD AUTO: 5.35 M/UL (ref 4.1–5.7)
SODIUM SERPL-SCNC: 139 MMOL/L (ref 136–145)
TROPONIN I SERPL HS-MCNC: 6 NG/L (ref 0–76)
TROPONIN I SERPL HS-MCNC: 7 NG/L (ref 0–76)
WBC # BLD AUTO: 7.9 K/UL (ref 4.1–11.1)

## 2025-05-21 PROCEDURE — 80053 COMPREHEN METABOLIC PANEL: CPT

## 2025-05-21 PROCEDURE — 85025 COMPLETE CBC W/AUTO DIFF WBC: CPT

## 2025-05-21 PROCEDURE — 84484 ASSAY OF TROPONIN QUANT: CPT

## 2025-05-21 PROCEDURE — 36415 COLL VENOUS BLD VENIPUNCTURE: CPT

## 2025-05-21 PROCEDURE — 71046 X-RAY EXAM CHEST 2 VIEWS: CPT

## 2025-05-21 PROCEDURE — 99284 EMERGENCY DEPT VISIT MOD MDM: CPT

## 2025-05-21 ASSESSMENT — LIFESTYLE VARIABLES
HOW MANY STANDARD DRINKS CONTAINING ALCOHOL DO YOU HAVE ON A TYPICAL DAY: PATIENT DOES NOT DRINK
HOW OFTEN DO YOU HAVE A DRINK CONTAINING ALCOHOL: NEVER

## 2025-05-22 VITALS
HEART RATE: 67 BPM | DIASTOLIC BLOOD PRESSURE: 66 MMHG | SYSTOLIC BLOOD PRESSURE: 125 MMHG | RESPIRATION RATE: 18 BRPM | TEMPERATURE: 97.8 F | OXYGEN SATURATION: 97 %

## 2025-05-22 NOTE — DISCHARGE INSTRUCTIONS
Thank you for choosing our Emergency Department for your care.  It is our privilege to care for you in your time of need.  In the next several days, you may receive a survey via email or mailed to your home about your experience with our team.  We would greatly appreciate you taking a few minutes to complete the survey, as we use this information to learn what we have done well and what we could be doing better. Thank you for trusting us with your care!    Below you will find a list of your tests from today's visit.   Labs and Radiology Studies  Recent Results (from the past 12 hours)   CBC with Auto Differential    Collection Time: 05/21/25  8:27 PM   Result Value Ref Range    WBC 7.9 4.1 - 11.1 K/uL    RBC 5.35 4.10 - 5.70 M/uL    Hemoglobin 14.7 12.1 - 17.0 g/dL    Hematocrit 47.1 36.6 - 50.3 %    MCV 88.0 80.0 - 99.0 FL    MCH 27.5 26.0 - 34.0 PG    MCHC 31.2 30.0 - 36.5 g/dL    RDW 13.7 11.5 - 14.5 %    Platelets 256 150 - 400 K/uL    MPV 10.2 8.9 - 12.9 FL    Nucleated RBCs 0.0 0  WBC    nRBC 0.00 0.00 - 0.01 K/uL    Neutrophils % 46.9 32.0 - 75.0 %    Lymphocytes % 41.3 12.0 - 49.0 %    Monocytes % 9.1 5.0 - 13.0 %    Eosinophils % 1.8 0.0 - 7.0 %    Basophils % 0.6 0.0 - 1.0 %    Immature Granulocytes % 0.3 0.0 - 0.5 %    Neutrophils Absolute 3.73 1.80 - 8.00 K/UL    Lymphocytes Absolute 3.28 0.80 - 3.50 K/UL    Monocytes Absolute 0.72 0.00 - 1.00 K/UL    Eosinophils Absolute 0.14 0.00 - 0.40 K/UL    Basophils Absolute 0.05 0.00 - 0.10 K/UL    Immature Granulocytes Absolute 0.02 0.00 - 0.04 K/UL    Differential Type AUTOMATED     Troponin    Collection Time: 05/21/25  8:27 PM   Result Value Ref Range    Troponin, High Sensitivity 6 0 - 76 ng/L   Comprehensive Metabolic Panel    Collection Time: 05/21/25  8:27 PM   Result Value Ref Range    Sodium 139 136 - 145 mmol/L    Potassium 4.1 3.5 - 5.1 mmol/L    Chloride 105 97 - 108 mmol/L    CO2 30 21 - 32 mmol/L    Anion Gap 4 2 - 12 mmol/L    Glucose  with other medications, please call your primary care provider or contact us directly.  Again, THANK YOU for choosing us to care for YOU!

## 2025-05-22 NOTE — ED PROVIDER NOTES
[facility] EMERGENCY DEPT  EMERGENCY DEPARTMENT HISTORY AND PHYSICAL EXAM      Date of evaluation: 5/21/2025  Patient Name: Pratik Richmond  Birthdate 1980  MRN: [id number]  ED Provider: NAVEEN Cm NP   Note Started: 10:58 PM EDT 5/21/25    HISTORY OF PRESENT ILLNESS     Chief Complaint   Patient presents with    Chest Pain     Pt ambulatory into triage with cc of chest pain starting yesterday. Pt reports it feels like a spasms on the L upper chest. Patient denies cardiac hx. Denies any medication PTA.        History Provided By: Patient, only     HPI: Pratik Richmond is a 45 y.o. male with past medical history as listed below, presents ambulatory into the emergency department accompanied by significant other with complaints of intermittent episodes of chest discomfort that started yesterday.  Patient describes the discomfort as a brief spasm that goes across the left side of his chest.  Patient states it is not very painful, \"I just know that it is there.\"  Episodes are brief and lasted only several minutes, not associated with his physical activity.  No shortness of breath, back pain, jaw pain, arm pain, abdominal pain, nausea/vomiting, cough, hemoptysis, recent surgery/trauma, hormone use, history of DVT/PE, leg swelling and is otherwise in his usual state of health.  No chest discomfort while in the emergency department. Upon arrival to the ED pt is alert and oriented x 3, well-appearing, and interacting appropriately; no obvious distress noted.      PAST MEDICAL HISTORY   Past Medical History:  Past Medical History:   Diagnosis Date    Hypercholesterolemia     Hypertension        Past Surgical History:  Past Surgical History:   Procedure Laterality Date    KNEE ARTHROSCOPY W/ MENISCAL REPAIR Right     ORTHOPEDIC SURGERY      Age 15/16: Cut 5 tendons on glass R forearm-> nl now    WISDOM TOOTH EXTRACTION  2005       Family History:  Family History   Problem Relation Age of Onset    Hypertension Mother

## 2025-05-22 NOTE — ED NOTES
Patient discharged from the ED by TYREE Arcos. Diagnosis, medications, precautions and follow-ups were reviewed with the patient/family. Questions were asked and answered prior to departure. Patient departed the ED via ambulatory and was accompanied by wife.